# Patient Record
Sex: FEMALE | Race: WHITE | NOT HISPANIC OR LATINO | Employment: UNEMPLOYED | URBAN - METROPOLITAN AREA
[De-identification: names, ages, dates, MRNs, and addresses within clinical notes are randomized per-mention and may not be internally consistent; named-entity substitution may affect disease eponyms.]

---

## 2017-03-31 ENCOUNTER — HOSPITAL ENCOUNTER (OUTPATIENT)
Dept: ULTRASOUND IMAGING | Facility: CLINIC | Age: 27
Discharge: HOME | End: 2017-03-31
Attending: PHYSICIAN ASSISTANT | Admitting: PHYSICIAN ASSISTANT
Payer: COMMERCIAL

## 2017-03-31 DIAGNOSIS — R93.89 ULTRASOUND SCAN ABNORMAL: ICD-10-CM

## 2017-03-31 PROCEDURE — 76642 ULTRASOUND BREAST LIMITED: CPT | Mod: 50

## 2018-05-23 ENCOUNTER — HOSPITAL ENCOUNTER (EMERGENCY)
Facility: CLINIC | Age: 28
Discharge: HOME | End: 2018-05-23
Attending: EMERGENCY MEDICINE | Admitting: EMERGENCY MEDICINE
Payer: COMMERCIAL

## 2018-05-23 VITALS
TEMPERATURE: 98 F | DIASTOLIC BLOOD PRESSURE: 70 MMHG | BODY MASS INDEX: 21.71 KG/M2 | WEIGHT: 115 LBS | OXYGEN SATURATION: 99 % | SYSTOLIC BLOOD PRESSURE: 103 MMHG | RESPIRATION RATE: 18 BRPM | HEIGHT: 61 IN

## 2018-05-23 DIAGNOSIS — N10 ACUTE PYELONEPHRITIS: ICD-10-CM

## 2018-05-23 LAB
ALBUMIN UR-MCNC: 100 MG/DL
ANION GAP SERPL CALCULATED.3IONS-SCNC: 8 MMOL/L (ref 3–14)
APPEARANCE UR: ABNORMAL
BACTERIA #/AREA URNS HPF: ABNORMAL /HPF
BASOPHILS # BLD AUTO: 0 10E9/L (ref 0–0.2)
BASOPHILS NFR BLD AUTO: 0.3 %
BILIRUB UR QL STRIP: NEGATIVE
BUN SERPL-MCNC: 7 MG/DL (ref 7–30)
CALCIUM SERPL-MCNC: 8.5 MG/DL (ref 8.5–10.1)
CHLORIDE SERPL-SCNC: 106 MMOL/L (ref 94–109)
CO2 SERPL-SCNC: 25 MMOL/L (ref 20–32)
COLOR UR AUTO: YELLOW
CREAT SERPL-MCNC: 0.64 MG/DL (ref 0.52–1.04)
DIFFERENTIAL METHOD BLD: ABNORMAL
EOSINOPHIL # BLD AUTO: 0.1 10E9/L (ref 0–0.7)
EOSINOPHIL NFR BLD AUTO: 1.2 %
ERYTHROCYTE [DISTWIDTH] IN BLOOD BY AUTOMATED COUNT: 11.7 % (ref 10–15)
GFR SERPL CREATININE-BSD FRML MDRD: >90 ML/MIN/1.7M2
GLUCOSE SERPL-MCNC: 91 MG/DL (ref 70–99)
GLUCOSE UR STRIP-MCNC: NEGATIVE MG/DL
HCG UR QL: NEGATIVE
HCT VFR BLD AUTO: 38 % (ref 35–47)
HGB BLD-MCNC: 12.7 G/DL (ref 11.7–15.7)
HGB UR QL STRIP: ABNORMAL
IMM GRANULOCYTES # BLD: 0 10E9/L (ref 0–0.4)
IMM GRANULOCYTES NFR BLD: 0.4 %
KETONES UR STRIP-MCNC: NEGATIVE MG/DL
LEUKOCYTE ESTERASE UR QL STRIP: ABNORMAL
LYMPHOCYTES # BLD AUTO: 1.9 10E9/L (ref 0.8–5.3)
LYMPHOCYTES NFR BLD AUTO: 17.2 %
MCH RBC QN AUTO: 31.1 PG (ref 26.5–33)
MCHC RBC AUTO-ENTMCNC: 33.4 G/DL (ref 31.5–36.5)
MCV RBC AUTO: 93 FL (ref 78–100)
MONOCYTES # BLD AUTO: 0.8 10E9/L (ref 0–1.3)
MONOCYTES NFR BLD AUTO: 7.2 %
MUCOUS THREADS #/AREA URNS LPF: PRESENT /LPF
NEUTROPHILS # BLD AUTO: 8.3 10E9/L (ref 1.6–8.3)
NEUTROPHILS NFR BLD AUTO: 73.7 %
NITRATE UR QL: NEGATIVE
NRBC # BLD AUTO: 0 10*3/UL
NRBC BLD AUTO-RTO: 0 /100
PH UR STRIP: 5 PH (ref 5–7)
PLATELET # BLD AUTO: 191 10E9/L (ref 150–450)
POTASSIUM SERPL-SCNC: 3.4 MMOL/L (ref 3.4–5.3)
RBC # BLD AUTO: 4.08 10E12/L (ref 3.8–5.2)
RBC #/AREA URNS AUTO: 125 /HPF (ref 0–2)
SODIUM SERPL-SCNC: 139 MMOL/L (ref 133–144)
SOURCE: ABNORMAL
SP GR UR STRIP: 1.01 (ref 1–1.03)
SQUAMOUS #/AREA URNS AUTO: 2 /HPF (ref 0–1)
UROBILINOGEN UR STRIP-MCNC: 0 MG/DL (ref 0–2)
WBC # BLD AUTO: 11.2 10E9/L (ref 4–11)
WBC #/AREA URNS AUTO: >182 /HPF (ref 0–5)
WBC CLUMPS #/AREA URNS HPF: PRESENT /HPF

## 2018-05-23 PROCEDURE — 85025 COMPLETE CBC W/AUTO DIFF WBC: CPT | Performed by: EMERGENCY MEDICINE

## 2018-05-23 PROCEDURE — 87186 SC STD MICRODIL/AGAR DIL: CPT | Performed by: EMERGENCY MEDICINE

## 2018-05-23 PROCEDURE — 87088 URINE BACTERIA CULTURE: CPT | Performed by: EMERGENCY MEDICINE

## 2018-05-23 PROCEDURE — 25000132 ZZH RX MED GY IP 250 OP 250 PS 637: Performed by: EMERGENCY MEDICINE

## 2018-05-23 PROCEDURE — 81025 URINE PREGNANCY TEST: CPT | Performed by: EMERGENCY MEDICINE

## 2018-05-23 PROCEDURE — 80048 BASIC METABOLIC PNL TOTAL CA: CPT | Performed by: EMERGENCY MEDICINE

## 2018-05-23 PROCEDURE — 81001 URINALYSIS AUTO W/SCOPE: CPT | Performed by: EMERGENCY MEDICINE

## 2018-05-23 PROCEDURE — 25000128 H RX IP 250 OP 636: Performed by: EMERGENCY MEDICINE

## 2018-05-23 PROCEDURE — 96365 THER/PROPH/DIAG IV INF INIT: CPT

## 2018-05-23 PROCEDURE — 96375 TX/PRO/DX INJ NEW DRUG ADDON: CPT

## 2018-05-23 PROCEDURE — 99284 EMERGENCY DEPT VISIT MOD MDM: CPT | Mod: 25

## 2018-05-23 PROCEDURE — 87086 URINE CULTURE/COLONY COUNT: CPT | Performed by: EMERGENCY MEDICINE

## 2018-05-23 RX ORDER — CEFTRIAXONE 1 G/1
1 INJECTION, POWDER, FOR SOLUTION INTRAMUSCULAR; INTRAVENOUS ONCE
Status: COMPLETED | OUTPATIENT
Start: 2018-05-23 | End: 2018-05-23

## 2018-05-23 RX ORDER — HYDROCODONE BITARTRATE AND ACETAMINOPHEN 5; 325 MG/1; MG/1
1-2 TABLET ORAL EVERY 4 HOURS PRN
Qty: 6 TABLET | Refills: 0 | Status: ON HOLD | OUTPATIENT
Start: 2018-05-23 | End: 2019-03-10

## 2018-05-23 RX ORDER — ONDANSETRON 4 MG/1
4 TABLET, ORALLY DISINTEGRATING ORAL EVERY 8 HOURS PRN
Qty: 10 TABLET | Refills: 0 | Status: SHIPPED | OUTPATIENT
Start: 2018-05-23 | End: 2018-05-26

## 2018-05-23 RX ORDER — HYDROCODONE BITARTRATE AND ACETAMINOPHEN 5; 325 MG/1; MG/1
1 TABLET ORAL ONCE
Status: COMPLETED | OUTPATIENT
Start: 2018-05-23 | End: 2018-05-23

## 2018-05-23 RX ORDER — KETOROLAC TROMETHAMINE 15 MG/ML
15 INJECTION, SOLUTION INTRAMUSCULAR; INTRAVENOUS ONCE
Status: COMPLETED | OUTPATIENT
Start: 2018-05-23 | End: 2018-05-23

## 2018-05-23 RX ORDER — ONDANSETRON 2 MG/ML
4 INJECTION INTRAMUSCULAR; INTRAVENOUS ONCE
Status: COMPLETED | OUTPATIENT
Start: 2018-05-23 | End: 2018-05-23

## 2018-05-23 RX ORDER — CIPROFLOXACIN 500 MG/1
500 TABLET, FILM COATED ORAL 2 TIMES DAILY
Qty: 14 TABLET | Refills: 0 | Status: SHIPPED | OUTPATIENT
Start: 2018-05-23 | End: 2018-05-30

## 2018-05-23 RX ADMIN — SODIUM CHLORIDE 1000 ML: 9 INJECTION, SOLUTION INTRAVENOUS at 05:38

## 2018-05-23 RX ADMIN — HYDROCODONE BITARTRATE AND ACETAMINOPHEN 1 TABLET: 5; 325 TABLET ORAL at 06:26

## 2018-05-23 RX ADMIN — KETOROLAC TROMETHAMINE 15 MG: 15 INJECTION, SOLUTION INTRAMUSCULAR; INTRAVENOUS at 05:42

## 2018-05-23 RX ADMIN — ONDANSETRON 4 MG: 2 INJECTION INTRAMUSCULAR; INTRAVENOUS at 05:41

## 2018-05-23 RX ADMIN — CEFTRIAXONE SODIUM 1 G: 1 INJECTION, POWDER, FOR SOLUTION INTRAMUSCULAR; INTRAVENOUS at 05:54

## 2018-05-23 ASSESSMENT — ENCOUNTER SYMPTOMS
DYSURIA: 1
FEVER: 0
DIARRHEA: 0
FLANK PAIN: 1
ABDOMINAL PAIN: 1
FREQUENCY: 1
HEMATURIA: 0
VOMITING: 1

## 2018-05-23 NOTE — ED AVS SNAPSHOT
[facility] Emergency Department    [address]    [facility] [address]    Phone:  [phone]    Fax:  [phone]                                       Caroline Del Valle   MRN: [id number]    Department:  [facility] Emergency Department   Date of Visit:  5/23/2018           Patient Information     Date Of Birth          1990        Your diagnoses for this visit were:     Acute pyelonephritis        You were seen by Kriss Ayers MD.      Follow-up Information     Follow up with Tiara Kelly PA-C In 2 days.    Specialty:  Physician Assistant    Contact information:    [facility]  [address]  [facility] [address]  [phone]          Follow up with [facility] Emergency Department.    Specialty:  EMERGENCY MEDICINE    Why:  If symptoms worsen    Contact information:    [address]  [facility] [id number] [phone]        Discharge Instructions           For your infection,an antibiotic from the fluoroquinolone family was prescribed. All medications can cause side effects or adverse reactions.  It is thought that there is a small chance that this medication can affect your nerves.  If you experience numbness, tingling or weakness, stop taking this medication and call your doctor.  It is thought that there is a small chance of tendon injuries/inflammation/rupture associated with this particular family of medications. It is concerning enough that the FDA has a specific warning about it. In choosing this medication, I ve considered alternatives but, given your infection, past history, allergies, and other factors, I think this is the best possible choice and that the risk to you is small. If you were to develop pain around your tendons/joints, stop taking the medication and contact a doctor.  If you develop joint or extremity pain, avoid doing heavy lifting or strenuous activities with the affected  area.  Secondly, all antibiotics increase your risk of diarrhea by changing that bacteria in the intestines. This family of medications also increases your risk of specific bacteral diarrheal infections such as C. dificile.  If you develop diarrhea or bloody stools, contact your doctor as you may need stool testing.    Discharge Instructions  Urinary Tract Infection  You have urinary tract infection, or UTI. The urinary tract includes the kidneys (which make urine), ureters (the tubes that carry urine from the kidneys to the bladder), the bladder (which stores urine), and urethra (the tube that carries urine out of the bladder).  Urinary tract infections occur when bacteria travel up the urethra into the bladder. We suspect a UTI based on chemical and microscopic findings in your urine, but if there is a question about your findings, we will do a culture to see if bacteria grow. A urine culture takes several days. You should always follow-up with your primary physician to find out about results of your culture if one was done.   Return to the Emergency Department if:    You have severe back pain.    You are vomiting so that you can t take your medicine, or have signs of dehydration (such as urinating less than 3 times per day).    You have fever over 101.5 degrees F.    You have significant confusion or are very weak, or feel very ill.    Your child seems much more ill, won t wake up, won t respond right, or is crying for a long time and won t calm down.    Your child is showing signs of dehydration, Signs of dehydration can be:  o Your infant has had no wet diapers in 4-5 hours.  o Your older child has not passed urine in 6-8 hours.  o Your infant or child starts to have dry mouth and lips, or no saliva or tears.    Follow-up with your doctor:     Children under 24 months need to be seen by their regular doctor within one week after a diagnosis of a UTI. It may be necessary to do some imaging tests to look at the  child s kidney or bladder.    You should begin to feel better within 24 - 48 hours of starting your antibiotic.  If you do not, you need to be seen again.      Treatment:     You will be treated with an antibiotic to kill the bacteria. We have to make an educated guess as to which antibiotic will work for your infection. In most healthy people, we can guess right almost all of the time. Sometimes a culture is done to show which antibiotics will work. This usually takes 2-3 days. When the culture is done, we may have to contact you to put you on a different antibiotic.    Take a pain medication such as Tylenol  (acetaminophen), Advil  (ibuprofen), Nuprin  (ibuprofen), or Aleve  (naproxen). If you have been given a narcotic such as Vicodin  (hydrocodone with acetaminophen), Percocet  (oxycodone with acetaminophen), or codeine, do not drive for four hours after you have taken it. If the narcotic contains Tylenol  (acetaminophen), do not take Tylenol  with it. All narcotics will cause constipation, so eat a high fiber diet.      Pyridium  (phenazopyridine) or Uristat  (phenazopyridine) is a prescription medication that numbs the bladder to reduce the burning pain of some UTIs.  The same medication is available in a non-prescription version called Azo-Standard  (phenazopyridine), Urodol  (phenazopyridine), or other brand names. This medication will change the color of the urine and tears (usually blue or orange). If you wear contacts, do not wear them while taking this medication as they may be stained by the medication.    Antibiotic Warning:     If you have been placed on antibiotics - watch for signs of allergic reaction.  These include rash, lip swelling, difficulty breathing, wheezing, and dizziness.  If you develop any of these symptoms, stop the antibiotic immediately and go to an emergency room or urgent care for evaluation.    Probiotics: If you have been given an antibiotic, you may want to also take a  "probiotic pill or eat yogurt with live cultures. Probiotics have \"good bacteria\" to help your intestines stay healthy. Studies have shown that probiotics help prevent diarrhea and other intestine problems (including C. diff infection) when you take antibiotics. You can buy these without a prescription in the pharmacy section of the store.   If you were given a prescription for medicine here today, be sure to read all of the information (including the package insert) that comes with your prescription.  This will include important information about the medicine, its side effects, and any warnings that you need to know about.  The pharmacist who fills the prescription can provide more information and answer questions you may have about the medicine.  If you have questions or concerns that the pharmacist cannot address, please call or return to the Emergency Department.   Opioid Medication Information    Pain medications are among the most commonly prescribed medicines, so we are including this information for all our patients. If you did not receive pain medication or get a prescription for pain medicine, you can ignore it.     You may have been given a prescription for an opioid (narcotic) pain medicine and/or have received a pain medicine while here in the Emergency Department. These medicines can make you drowsy or impaired. You must not drive, operate dangerous equipment, or engage in any other dangerous activities while taking these medications. If you drive while taking these medications, you could be arrested for DUI, or driving under the influence. Do not drink any alcohol while you are taking these medications.     Opioid pain medications can cause addiction. If you have a history of chemical dependency of any type, you are at a higher risk of becoming addicted to pain medications.  Only take these prescribed medications to treat your pain when all other options have been tried. Take it for as short a time and " as few doses as possible. Store your pain pills in a secure place, as they are frequently stolen and provide a dangerous opportunity for children or visitors in your house to start abusing these powerful medications. We will not replace any lost or stolen medicine.  As soon as your pain is better, you should flush all your remaining medication.     Many prescription pain medications contain Tylenol  (acetaminophen), including Vicodin , Tylenol #3 , Norco , Lortab , and Percocet .  You should not take any extra pills of Tylenol  if you are using these prescription medications or you can get very sick.  Do not ever take more than 3000 mg of acetaminophen in any 24 hour period.    All opioids tend to cause constipation. Drink plenty of water and eat foods that have a lot of fiber, such as fruits, vegetables, prune juice, apple juice and high fiber cereal.  Take a laxative if you don t move your bowels at least every other day. Miralax , Milk of Magnesia, Colace , or Senna  can be used to keep you regular.      Remember that you can always come back to the Emergency Department if you are not able to see your regular doctor in the amount of time listed above, if you get any new symptoms, or if there is anything that worries you.        24 Hour Appointment Hotline       To make an appointment at any [place] clinic, call [phone] ([phone]). If you don't have a family doctor or clinic, we will help you find one. [place] clinics are conveniently located to serve the needs of you and your family.             Review of your medicines      START taking        Dose / Directions Last dose taken    ciprofloxacin 500 MG tablet   Commonly known as:  CIPRO   Dose:  500 mg   Quantity:  14 tablet        Take 1 tablet (500 mg) by mouth 2 times daily for 7 days   Refills:  0        HYDROcodone-acetaminophen 5-325 MG per tablet   Commonly known as:  NORCO   Dose:  1-2 tablet   Quantity:  6 tablet        Take 1-2 tablets by  mouth every 4 hours as needed for pain   Refills:  0        ondansetron 4 MG ODT tab   Commonly known as:  ZOFRAN ODT   Dose:  4 mg   Quantity:  10 tablet        Take 1 tablet (4 mg) by mouth every 8 hours as needed for nausea   Refills:  0                Information about OPIOIDS     PRESCRIPTION OPIOIDS: WHAT YOU NEED TO KNOW   You have a prescription for an opioid (narcotic) pain medicine. Opioids can cause addiction. If you have a history of chemical dependency of any type, you are at a higher risk of becoming addicted to opioids. Only take this medicine after all other options have been tried. Take it for as short a time and as few doses as possible.     Do not:    Drive. If you drive while taking these medicines, you could be arrested for driving under the influence (DUI).    Operate heavy machinery    Do any other dangerous activities while taking these medicines.     Drink any alcohol while taking these medicines.      Take with any other medicines that contain acetaminophen. Read all labels carefully. Look for the word  acetaminophen  or  Tylenol.  Ask your pharmacist if you have questions or are unsure.    Store your pills in a secure place, locked if possible. We will not replace any lost or stolen medicine. If you don t finish your medicine, please throw away (dispose) as directed by your pharmacist. The [place] [employer] has more information about safe disposal: https://www.pca.[facility].[place]/living-green/managing-unwanted-medications    All opioids tend to cause constipation. Drink plenty of water and eat foods that have a lot of fiber, such as fruits, vegetables, prune juice, apple juice and high-fiber cereal. Take a laxative (Miralax, milk of magnesia, Colace, Senna) if you don t move your bowels at least every other day.         Prescriptions were sent or printed at these locations (3 Prescriptions)                   Other Prescriptions                Printed at Department/Unit printer  (3 of 3)         ciprofloxacin (CIPRO) 500 MG tablet               HYDROcodone-acetaminophen (NORCO) 5-325 MG per tablet               ondansetron (ZOFRAN ODT) 4 MG ODT tab                Procedures and tests performed during your visit     Basic metabolic panel    CBC + differential    HCG qualitative urine    UA reflex to Microscopic and Culture    Urine Culture Aerobic Bacterial      Orders Needing Specimen Collection     None      Pending Results     Date and Time Order Name Status Description    5/23/2018 0510 Urine Culture Aerobic Bacterial In process             Pending Culture Results     Date and Time Order Name Status Description    5/23/2018 0510 Urine Culture Aerobic Bacterial In process             Pending Results Instructions     If you had any lab results that were not finalized at the time of your Discharge, you can call the ED Lab Result RN at [phone]. You will be contacted by this team for any positive Lab results or changes in treatment. The nurses are available 7 days a week from 10A to 6:30P.  You can leave a message 24 hours per day and they will return your call.        Test Results From Your Hospital Stay        5/23/2018  5:30 AM      Component Results     Component Value Ref Range & Units Status    Color Urine Yellow  Final    Appearance Urine Cloudy  Final    Glucose Urine Negative NEG^Negative mg/dL Final    Bilirubin Urine Negative NEG^Negative Final    Ketones Urine Negative NEG^Negative mg/dL Final    Specific Gravity Urine 1.015 1.003 - 1.035 Final    Blood Urine Moderate (A) NEG^Negative Final    pH Urine 5.0 5.0 - 7.0 pH Final    Protein Albumin Urine 100 (A) NEG^Negative mg/dL Final    Urobilinogen mg/dL 0.0 0.0 - 2.0 mg/dL Final    Nitrite Urine Negative NEG^Negative Final    Leukocyte Esterase Urine Large (A) NEG^Negative Final    Source Unspecified Urine  Final    RBC Urine 125 (H) 0 - 2 /HPF Final    WBC Urine >182 (H) 0 - 5 /HPF Final    WBC Clumps Present (A)  NEG^Negative /HPF Final    Bacteria Urine Many (A) NEG^Negative /HPF Final    Squamous Epithelial /HPF Urine 2 (H) 0 - 1 /HPF Final    Mucous Urine Present (A) NEG^Negative /LPF Final         5/23/2018  5:37 AM      Component Results     Component Value Ref Range & Units Status    HCG Qual Urine Negative NEG^Negative Final    This test is for screening purposes.  Results should be interpreted along with   the clinical picture.  Confirmation testing is available if warranted by   ordering [id number], HCG Quantitative Pregnancy.           5/23/2018  5:55 AM      Component Results     Component Value Ref Range & Units Status    WBC 11.2 (H) 4.0 - 11.0 10e9/L Final    RBC Count 4.08 3.8 - 5.2 10e12/L Final    Hemoglobin 12.7 11.7 - 15.7 g/dL Final    Hematocrit 38.0 35.0 - 47.0 % Final    MCV 93 78 - 100 fl Final    MCH 31.1 26.5 - 33.0 pg Final    MCHC 33.4 31.5 - 36.5 g/dL Final    RDW 11.7 10.0 - 15.0 % Final    Platelet Count 191 150 - 450 10e9/L Final    Diff Method Automated Method  Final    % Neutrophils 73.7 % Final    % Lymphocytes 17.2 % Final    % Monocytes 7.2 % Final    % Eosinophils 1.2 % Final    % Basophils 0.3 % Final    % Immature Granulocytes 0.4 % Final    Nucleated RBCs 0 0 /100 Final    Absolute Neutrophil 8.3 1.6 - 8.3 10e9/L Final    Absolute Lymphocytes 1.9 0.8 - 5.3 10e9/L Final    Absolute Monocytes 0.8 0.0 - 1.3 10e9/L Final    Absolute Eosinophils 0.1 0.0 - 0.7 10e9/L Final    Absolute Basophils 0.0 0.0 - 0.2 10e9/L Final    Abs Immature Granulocytes 0.0 0 - 0.4 10e9/L Final    Absolute Nucleated RBC 0.0  Final         5/23/2018  6:10 AM      Component Results     Component Value Ref Range & Units Status    Sodium 139 133 - 144 mmol/L Final    Potassium 3.4 3.4 - 5.3 mmol/L Final    Chloride 106 94 - 109 mmol/L Final    Carbon Dioxide 25 20 - 32 mmol/L Final    Anion Gap 8 3 - 14 mmol/L Final    Glucose 91 70 - 99 mg/dL Final    Urea Nitrogen 7 7 - 30 mg/dL Final    Creatinine 0.64 0.52 - 1.04  mg/dL Final    GFR Estimate >90 >60 mL/min/1.7m2 Final    Non  GFR Calc    GFR Estimate If Black >90 >60 mL/min/1.7m2 Final    African American GFR Calc    Calcium 8.5 8.5 - 10.1 mg/dL Final         5/23/2018  5:31 AM                Clinical Quality Measure: Blood Pressure Screening     Your blood pressure was checked while you were in the emergency department today. The last reading we obtained was  BP: 103/70 . Please read the guidelines below about what these numbers mean and what you should do about them.  If your systolic blood pressure (the top number) is less than 120 and your diastolic blood pressure (the bottom number) is less than 80, then your blood pressure is normal. There is nothing more that you need to do about it.  If your systolic blood pressure (the top number) is 120-139 or your diastolic blood pressure (the bottom number) is 80-89, your blood pressure may be higher than it should be. You should have your blood pressure rechecked within a year by a primary care provider.  If your systolic blood pressure (the top number) is 140 or greater or your diastolic blood pressure (the bottom number) is 90 or greater, you may have high blood pressure. High blood pressure is treatable, but if left untreated over time it can put you at risk for heart attack, stroke, or kidney failure. You should have your blood pressure rechecked by a primary care provider within the next 4 weeks.  If your provider in the emergency department today gave you specific instructions to follow-up with your doctor or provider even sooner than that, you should follow that instruction and not wait for up to 4 weeks for your follow-up visit.        Thank you for choosing [facility]       Thank you for choosing [facility] for your care. Our goal is always to provide you with excellent care. Hearing back from our patients is one way we can continue to improve our services. Please take a few minutes to complete the written  "survey that you may receive in the mail after you visit with us. Thank you!        [employer] Information     [employer] lets you send messages to your doctor, view your test results, renew your prescriptions, schedule appointments and more. To sign up, go to [email or web address] . Click on \"Log in\" on the left side of the screen, which will take you to the Welcome page. Then click on \"Sign up Now\" on the right side of the page.     You will be asked to enter the access code listed below, as well as some personal information. Please follow the directions to create your username and password.     Your access code is: [email or web address]  Expires: 2018  6:31 AM     Your access code will  in 90 days. If you need help or a new code, please call your [facility] clinic or [phone].        Care EveryWhere ID     This is your Care EveryWhere ID. This could be used by other organizations to access your [facility] medical records  [id number]        Equal Access to Services     PEG [place] : Hadsofia diazo Somariel, waaxda luqadaha, qaybta kaalmadelfina reynolds, coy marquez . So [facility] [phone].    ATENCIÓN: Si habla español, tiene a maravilla disposición servicios gratuitos de asistencia lingüística. Llame al [phone].    We comply with applicable federal civil rights laws and [place] laws. We do not discriminate on the basis of race, color, national origin, age, disability, sex, sexual orientation, or gender identity.            After Visit Summary       This is your record. Keep this with you and show to your community pharmacist(s) and doctor(s) at your next visit.                  "

## 2018-05-23 NOTE — ED PROVIDER NOTES
"  History     Chief Complaint:  Abdominal Pain    HPI   Caroline Del Valle is an otherwise healthy 27 year old female who presents to the Emergency Department for evaluation of abdominal pain. Upon presentation the patient reports two days of worsening right sided abdominal pain and bloating which wraps around to her right flank, now with associated dysuria, urgency, frequency and one episode of vomiting yesterday. She denies any fevers, hematuria, vaginal bleeding, vaginal discharge, chest pain, shortness of breath or diarrhea. Patient does have a history of UTI remotely but no hx of renal stone or resistent UTI.     Allergies:  No known drug allergies    Medications:    The patient is not currently taking any prescribed medications.    Past Medical History:    The patient denies any significant past medical history.    Past Surgical History:    The patient does not have any pertinent past surgical history.    Family History:    No past pertinent family history.    Social History:  The patient was accompanied to the ED by .  Smoking Status: never smoker  Alcohol Use: no  Marital Status:   [2]     Review of Systems   Constitutional: Negative for fever.   Cardiovascular: Negative for chest pain.   Gastrointestinal: Positive for abdominal pain and vomiting. Negative for diarrhea.   Genitourinary: Positive for dysuria, flank pain, frequency and urgency. Negative for hematuria, vaginal bleeding and vaginal discharge.   All other systems reviewed and are negative.    Physical Exam   First Vitals:  BP: 135/86  Heart Rate: 96  Temp: 98  F (36.7  C)  Resp: 18  Height: 154.9 cm (5' 1\")  Weight: 52.2 kg (115 lb)  SpO2: 99 %    Physical Exam  Gen: alert  HEENT: PERRL, oropharynx clear  CV: RRR, no murmurs  Pulm: breath sounds equal, lungs clear  Abd: Soft, Right side abdominal tenderness. Right CVA tenderness. Suprapubic tenderness.  Back: no evidence of injury  MSK: no deformity, moves all extremities  Skin: no " rash  Neuro: alert, appropriate conversation and interaction    Emergency Department Course     Laboratory:  UA reflex to microscopic and culture: moderate urine blood, protein albumin 100, large leukocyte esterase, (H), WBC >182(H), WBC clumps present, many bacteria, squamous epithelial 2(H), mucous present, o/w WNL.  HCG:  Negative  Urine culture Aerobic Bacterial: In process    Interventions:  0538 NS 1 L IV  0541 Zofran 4 mg IV  0542 Toradol 15 mg IV  0554 Ceftriaxone 1 g IV    Emergency Department Course:  Nursing notes and vitals reviewed. I performed an exam of the patient as documented above.     The patient provided a urine sample here in the emergency department. This was sent for laboratory testing, findings above.    0615 I reassessed the patient. Patient is feeling much better.     Findings and plan explained to the Patient. Patient discharged home with instructions regarding supportive care, medications, and reasons to return. The importance of close follow-up was reviewed.     Impression & Plan      Medical Decision Making:  Caroline Del Valle is a 27 year old female who presents for evaluation of abdominal pain.  Urinalysis is consistent with a urinary tract infection; given the systemic symptoms present, signs and symptoms consistent with pyelonephritis.  There is no clinical evidence of perinephric abscess, emphysematous pyelonephritis, ureterolithiasis, appendicitis, colitis, diverticulitis or any intraabdominal catastrophe.  Gradual onset of pain and no hx of stone therefore despite hematuria I did not feel that CT for ureteral stone was needed.  Pain pattern is not suggestive of ureteral colic.  Patient was given dose of antibiotics in ED; see above.  Given well appearance, I believe the risk of imminent deterioration is low enough that outpatient management is indicated.  Return if increasing pain, vomiting, fever, or inability to tolerate the oral antibiotic.  Follow up with primary  physician is indicated in 1 days.      Diagnosis:    ICD-10-CM    1. Acute pyelonephritis N10      Disposition:  discharged to home    Discharge Medications:  New Prescriptions    CIPROFLOXACIN (CIPRO) 500 MG TABLET    Take 1 tablet (500 mg) by mouth 2 times daily for 7 days    HYDROCODONE-ACETAMINOPHEN (NORCO) 5-325 MG PER TABLET    Take 1-2 tablets by mouth every 4 hours as needed for pain    ONDANSETRON (ZOFRAN ODT) 4 MG ODT TAB    Take 1 tablet (4 mg) by mouth every 8 hours as needed for nausea       IRomi, am serving as a scribe on 5/23/2018 at 5:15 AM to personally document services performed by Kriss Ayers* based on my observations and the provider's statements to me.     Romi Rm  5/23/2018   [facility] EMERGENCY DEPARTMENT       Kriss Ayers MD  05/23/18 0739

## 2018-05-23 NOTE — DISCHARGE INSTRUCTIONS
For your infection,an antibiotic from the fluoroquinolone family was prescribed. All medications can cause side effects or adverse reactions.  It is thought that there is a small chance that this medication can affect your nerves.  If you experience numbness, tingling or weakness, stop taking this medication and call your doctor.  It is thought that there is a small chance of tendon injuries/inflammation/rupture associated with this particular family of medications. It is concerning enough that the FDA has a specific warning about it. In choosing this medication, I ve considered alternatives but, given your infection, past history, allergies, and other factors, I think this is the best possible choice and that the risk to you is small. If you were to develop pain around your tendons/joints, stop taking the medication and contact a doctor.  If you develop joint or extremity pain, avoid doing heavy lifting or strenuous activities with the affected area.  Secondly, all antibiotics increase your risk of diarrhea by changing that bacteria in the intestines. This family of medications also increases your risk of specific bacteral diarrheal infections such as C. dificile.  If you develop diarrhea or bloody stools, contact your doctor as you may need stool testing.    Discharge Instructions  Urinary Tract Infection  You have urinary tract infection, or UTI. The urinary tract includes the kidneys (which make urine), ureters (the tubes that carry urine from the kidneys to the bladder), the bladder (which stores urine), and urethra (the tube that carries urine out of the bladder).  Urinary tract infections occur when bacteria travel up the urethra into the bladder. We suspect a UTI based on chemical and microscopic findings in your urine, but if there is a question about your findings, we will do a culture to see if bacteria grow. A urine culture takes several days. You should always follow-up with your primary physician  to find out about results of your culture if one was done.   Return to the Emergency Department if:    You have severe back pain.    You are vomiting so that you can t take your medicine, or have signs of dehydration (such as urinating less than 3 times per day).    You have fever over 101.5 degrees F.    You have significant confusion or are very weak, or feel very ill.    Your child seems much more ill, won t wake up, won t respond right, or is crying for a long time and won t calm down.    Your child is showing signs of dehydration, Signs of dehydration can be:  o Your infant has had no wet diapers in 4-5 hours.  o Your older child has not passed urine in 6-8 hours.  o Your infant or child starts to have dry mouth and lips, or no saliva or tears.    Follow-up with your doctor:     Children under 24 months need to be seen by their regular doctor within one week after a diagnosis of a UTI. It may be necessary to do some imaging tests to look at the child s kidney or bladder.    You should begin to feel better within 24 - 48 hours of starting your antibiotic.  If you do not, you need to be seen again.      Treatment:     You will be treated with an antibiotic to kill the bacteria. We have to make an educated guess as to which antibiotic will work for your infection. In most healthy people, we can guess right almost all of the time. Sometimes a culture is done to show which antibiotics will work. This usually takes 2-3 days. When the culture is done, we may have to contact you to put you on a different antibiotic.    Take a pain medication such as Tylenol  (acetaminophen), Advil  (ibuprofen), Nuprin  (ibuprofen), or Aleve  (naproxen). If you have been given a narcotic such as Vicodin  (hydrocodone with acetaminophen), Percocet  (oxycodone with acetaminophen), or codeine, do not drive for four hours after you have taken it. If the narcotic contains Tylenol  (acetaminophen), do not take Tylenol  with it. All narcotics  "will cause constipation, so eat a high fiber diet.      Pyridium  (phenazopyridine) or Uristat  (phenazopyridine) is a prescription medication that numbs the bladder to reduce the burning pain of some UTIs.  The same medication is available in a non-prescription version called Azo-Standard  (phenazopyridine), Urodol  (phenazopyridine), or other brand names. This medication will change the color of the urine and tears (usually blue or orange). If you wear contacts, do not wear them while taking this medication as they may be stained by the medication.    Antibiotic Warning:     If you have been placed on antibiotics - watch for signs of allergic reaction.  These include rash, lip swelling, difficulty breathing, wheezing, and dizziness.  If you develop any of these symptoms, stop the antibiotic immediately and go to an emergency room or urgent care for evaluation.    Probiotics: If you have been given an antibiotic, you may want to also take a probiotic pill or eat yogurt with live cultures. Probiotics have \"good bacteria\" to help your intestines stay healthy. Studies have shown that probiotics help prevent diarrhea and other intestine problems (including C. diff infection) when you take antibiotics. You can buy these without a prescription in the pharmacy section of the store.   If you were given a prescription for medicine here today, be sure to read all of the information (including the package insert) that comes with your prescription.  This will include important information about the medicine, its side effects, and any warnings that you need to know about.  The pharmacist who fills the prescription can provide more information and answer questions you may have about the medicine.  If you have questions or concerns that the pharmacist cannot address, please call or return to the Emergency Department.   Opioid Medication Information    Pain medications are among the most commonly prescribed medicines, so we are " including this information for all our patients. If you did not receive pain medication or get a prescription for pain medicine, you can ignore it.     You may have been given a prescription for an opioid (narcotic) pain medicine and/or have received a pain medicine while here in the Emergency Department. These medicines can make you drowsy or impaired. You must not drive, operate dangerous equipment, or engage in any other dangerous activities while taking these medications. If you drive while taking these medications, you could be arrested for DUI, or driving under the influence. Do not drink any alcohol while you are taking these medications.     Opioid pain medications can cause addiction. If you have a history of chemical dependency of any type, you are at a higher risk of becoming addicted to pain medications.  Only take these prescribed medications to treat your pain when all other options have been tried. Take it for as short a time and as few doses as possible. Store your pain pills in a secure place, as they are frequently stolen and provide a dangerous opportunity for children or visitors in your house to start abusing these powerful medications. We will not replace any lost or stolen medicine.  As soon as your pain is better, you should flush all your remaining medication.     Many prescription pain medications contain Tylenol  (acetaminophen), including Vicodin , Tylenol #3 , Norco , Lortab , and Percocet .  You should not take any extra pills of Tylenol  if you are using these prescription medications or you can get very sick.  Do not ever take more than 3000 mg of acetaminophen in any 24 hour period.    All opioids tend to cause constipation. Drink plenty of water and eat foods that have a lot of fiber, such as fruits, vegetables, prune juice, apple juice and high fiber cereal.  Take a laxative if you don t move your bowels at least every other day. Miralax , Milk of Magnesia, Colace , or Senna  can  be used to keep you regular.      Remember that you can always come back to the Emergency Department if you are not able to see your regular doctor in the amount of time listed above, if you get any new symptoms, or if there is anything that worries you.

## 2018-05-23 NOTE — ED TRIAGE NOTES
Patient present to ED due pain to lower abd radiating to pelvis and R flank. Reports burning sensation to void     Reports vomiting yesterday. Denies fever.     Last BM yesterday morning     Denies taking OTC meds PTA

## 2018-05-23 NOTE — ED AVS SNAPSHOT
[facility]    [address]    [facility] [address]    Phone:  [phone]    Fax:  [phone]                                       Caroline Del Valle   MRN: [id number]    Department:  [facility]   Date of Visit:  5/23/2018           After Visit Summary Signature Page     I have received my discharge instructions, and my questions have been answered. I have discussed any challenges I see with this plan with the nurse or doctor.    ..........................................................................................................................................  Patient/Patient Representative Signature      ..........................................................................................................................................  Patient Representative Print Name and Relationship to Patient    ..................................................               ................................................  Date                                            Time    ..........................................................................................................................................  Reviewed by Signature/Title    ...................................................              ..............................................  Date                                                            Time

## 2018-05-24 LAB
BACTERIA SPEC CULT: ABNORMAL
Lab: ABNORMAL
SPECIMEN SOURCE: ABNORMAL

## 2018-10-07 ENCOUNTER — TRANSFERRED RECORDS (OUTPATIENT)
Dept: HEALTH INFORMATION MANAGEMENT | Facility: CLINIC | Age: 28
End: 2018-10-07

## 2018-10-07 ENCOUNTER — APPOINTMENT (OUTPATIENT)
Dept: ULTRASOUND IMAGING | Facility: CLINIC | Age: 28
End: 2018-10-07
Attending: EMERGENCY MEDICINE
Payer: COMMERCIAL

## 2018-10-07 ENCOUNTER — HOSPITAL ENCOUNTER (EMERGENCY)
Facility: CLINIC | Age: 28
Discharge: HOME | End: 2018-10-07
Attending: EMERGENCY MEDICINE | Admitting: EMERGENCY MEDICINE
Payer: COMMERCIAL

## 2018-10-07 VITALS
SYSTOLIC BLOOD PRESSURE: 99 MMHG | WEIGHT: 119.93 LBS | RESPIRATION RATE: 18 BRPM | TEMPERATURE: 98.6 F | BODY MASS INDEX: 22.66 KG/M2 | HEART RATE: 90 BPM | OXYGEN SATURATION: 100 % | DIASTOLIC BLOOD PRESSURE: 68 MMHG

## 2018-10-07 DIAGNOSIS — R10.31 ABDOMINAL PAIN, RIGHT LOWER QUADRANT: ICD-10-CM

## 2018-10-07 DIAGNOSIS — Z3A.18 18 WEEKS GESTATION OF PREGNANCY: ICD-10-CM
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
## 2018-10-07 LAB
ABO + RH BLD: NORMAL
ABO + RH BLD: NORMAL
ALBUMIN UR-MCNC: NEGATIVE MG/DL
APPEARANCE UR: CLEAR
BASOPHILS # BLD AUTO: 0 10E9/L (ref 0–0.2)
BASOPHILS NFR BLD AUTO: 0.3 %
BILIRUB UR QL STRIP: NEGATIVE
COLOR UR AUTO: NORMAL
DIFFERENTIAL METHOD BLD: ABNORMAL
EOSINOPHIL # BLD AUTO: 0.2 10E9/L (ref 0–0.7)
EOSINOPHIL NFR BLD AUTO: 1.6 %
ERYTHROCYTE [DISTWIDTH] IN BLOOD BY AUTOMATED COUNT: 12.4 % (ref 10–15)
GLUCOSE UR STRIP-MCNC: NEGATIVE MG/DL
HCT VFR BLD AUTO: 37.6 % (ref 35–47)
HGB BLD-MCNC: 12.7 G/DL (ref 11.7–15.7)
HGB F BLD QL KLEIH BETKE: NORMAL
HGB UR QL STRIP: NEGATIVE
IMM GRANULOCYTES # BLD: 0.1 10E9/L (ref 0–0.4)
IMM GRANULOCYTES NFR BLD: 0.5 %
KETONES UR STRIP-MCNC: NEGATIVE MG/DL
LEUKOCYTE ESTERASE UR QL STRIP: NEGATIVE
LYMPHOCYTES # BLD AUTO: 2.3 10E9/L (ref 0.8–5.3)
LYMPHOCYTES NFR BLD AUTO: 19.7 %
MCH RBC QN AUTO: 31.7 PG (ref 26.5–33)
MCHC RBC AUTO-ENTMCNC: 33.8 G/DL (ref 31.5–36.5)
MCV RBC AUTO: 94 FL (ref 78–100)
MONOCYTES # BLD AUTO: 0.9 10E9/L (ref 0–1.3)
MONOCYTES NFR BLD AUTO: 7.7 %
NEUTROPHILS # BLD AUTO: 8.1 10E9/L (ref 1.6–8.3)
NEUTROPHILS NFR BLD AUTO: 70.2 %
NITRATE UR QL: NEGATIVE
NRBC # BLD AUTO: 0 10*3/UL
NRBC BLD AUTO-RTO: 0 /100
PH UR STRIP: 6 PH (ref 5–7)
PLATELET # BLD AUTO: 202 10E9/L (ref 150–450)
RBC # BLD AUTO: 4.01 10E12/L (ref 3.8–5.2)
RBC #/AREA URNS AUTO: 1 /HPF (ref 0–2)
SOURCE: NORMAL
SP GR UR STRIP: 1 (ref 1–1.03)
SPECIMEN EXP DATE BLD: NORMAL
SQUAMOUS #/AREA URNS AUTO: 1 /HPF (ref 0–1)
UROBILINOGEN UR STRIP-MCNC: 0 MG/DL (ref 0–2)
WBC # BLD AUTO: 11.5 10E9/L (ref 4–11)
WBC #/AREA URNS AUTO: 2 /HPF (ref 0–5)

## 2018-10-07 PROCEDURE — 86900 BLOOD TYPING SEROLOGIC ABO: CPT | Performed by: EMERGENCY MEDICINE

## 2018-10-07 PROCEDURE — 76815 OB US LIMITED FETUS(S): CPT

## 2018-10-07 PROCEDURE — 99284 EMERGENCY DEPT VISIT MOD MDM: CPT | Mod: 25

## 2018-10-07 PROCEDURE — 85460 HEMOGLOBIN FETAL: CPT | Performed by: EMERGENCY MEDICINE

## 2018-10-07 PROCEDURE — 86901 BLOOD TYPING SEROLOGIC RH(D): CPT | Performed by: EMERGENCY MEDICINE

## 2018-10-07 PROCEDURE — 81001 URINALYSIS AUTO W/SCOPE: CPT | Performed by: EMERGENCY MEDICINE

## 2018-10-07 PROCEDURE — 85025 COMPLETE CBC W/AUTO DIFF WBC: CPT | Performed by: EMERGENCY MEDICINE

## 2018-10-07 ASSESSMENT — ENCOUNTER SYMPTOMS
NECK PAIN: 0
ABDOMINAL PAIN: 1
HEMATURIA: 0
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
## 2018-10-07 NOTE — ED AVS SNAPSHOT
[facility] Emergency Department    [address]    [facility] [address]    Phone:  [phone]    Fax:  [phone]                                       Caroline Del Valle   MRN: [id number]    Department:  [facility] Emergency Department   Date of Visit:  10/7/2018           After Visit Summary Signature Page     I have received my discharge instructions, and my questions have been answered. I have discussed any challenges I see with this plan with the nurse or doctor.    ..........................................................................................................................................  Patient/Patient Representative Signature      ..........................................................................................................................................  Patient Representative Print Name and Relationship to Patient    ..................................................               ................................................  Date                                   Time    ..........................................................................................................................................  Reviewed by Signature/Title    ...................................................              ..............................................  Date                                               Time          22EPIC Rev 08/18
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
## 2018-12-20 LAB — TREPONEMA ANTIBODIES: NORMAL

## 2019-02-11 LAB — GROUP B STREP PCR: POSITIVE

## 2019-03-08 ENCOUNTER — HOSPITAL ENCOUNTER (OUTPATIENT)
Facility: CLINIC | Age: 29
End: 2019-03-08
Payer: COMMERCIAL

## 2019-03-10 ENCOUNTER — ANESTHESIA EVENT (OUTPATIENT)
Dept: OBGYN | Facility: CLINIC | Age: 29
End: 2019-03-10
Payer: COMMERCIAL

## 2019-03-10 ENCOUNTER — ANESTHESIA (OUTPATIENT)
Dept: OBGYN | Facility: CLINIC | Age: 29
End: 2019-03-10
Payer: COMMERCIAL

## 2019-03-10 ENCOUNTER — HOSPITAL ENCOUNTER (INPATIENT)
Facility: CLINIC | Age: 29
LOS: 3 days | Discharge: HOME | End: 2019-03-13
Attending: OBSTETRICS & GYNECOLOGY | Admitting: OBSTETRICS & GYNECOLOGY
Payer: COMMERCIAL

## 2019-03-10 LAB
ABO + RH BLD: NORMAL
ABO + RH BLD: NORMAL
BLD GP AB SCN SERPL QL: NORMAL
BLOOD BANK CMNT PATIENT-IMP: NORMAL
RUPTURE OF FETAL MEMBRANES BY ROM PLUS: POSITIVE
SPECIMEN EXP DATE BLD: NORMAL

## 2019-03-10 PROCEDURE — 12000000 ZZH R&B MED SURG/OB

## 2019-03-10 PROCEDURE — 25000125 ZZHC RX 250: Performed by: OBSTETRICS & GYNECOLOGY

## 2019-03-10 PROCEDURE — 3E0R3BZ INTRODUCTION OF ANESTHETIC AGENT INTO SPINAL CANAL, PERCUTANEOUS APPROACH: ICD-10-PCS | Performed by: ANESTHESIOLOGY

## 2019-03-10 PROCEDURE — 86901 BLOOD TYPING SEROLOGIC RH(D): CPT | Performed by: OBSTETRICS & GYNECOLOGY

## 2019-03-10 PROCEDURE — 86850 RBC ANTIBODY SCREEN: CPT | Performed by: OBSTETRICS & GYNECOLOGY

## 2019-03-10 PROCEDURE — 25800030 ZZH RX IP 258 OP 636: Performed by: OBSTETRICS & GYNECOLOGY

## 2019-03-10 PROCEDURE — 84112 EVAL AMNIOTIC FLUID PROTEIN: CPT | Performed by: OBSTETRICS & GYNECOLOGY

## 2019-03-10 PROCEDURE — G0463 HOSPITAL OUTPT CLINIC VISIT: HCPCS

## 2019-03-10 PROCEDURE — 25800030 ZZH RX IP 258 OP 636: Performed by: ANESTHESIOLOGY

## 2019-03-10 PROCEDURE — 37000011 ZZH ANESTHESIA WARD SERVICE

## 2019-03-10 PROCEDURE — 25000128 H RX IP 250 OP 636: Performed by: OBSTETRICS & GYNECOLOGY

## 2019-03-10 PROCEDURE — 0064U ANTB TP TOTAL&RPR IA QUAL: CPT | Performed by: OBSTETRICS & GYNECOLOGY

## 2019-03-10 PROCEDURE — 40000671 ZZH STATISTIC ANESTHESIA CASE

## 2019-03-10 PROCEDURE — 86900 BLOOD TYPING SEROLOGIC ABO: CPT | Performed by: OBSTETRICS & GYNECOLOGY

## 2019-03-10 PROCEDURE — 27110038 ZZH RX 271: Performed by: ANESTHESIOLOGY

## 2019-03-10 PROCEDURE — 00HU33Z INSERTION OF INFUSION DEVICE INTO SPINAL CANAL, PERCUTANEOUS APPROACH: ICD-10-PCS | Performed by: ANESTHESIOLOGY

## 2019-03-10 PROCEDURE — 25000128 H RX IP 250 OP 636: Performed by: ANESTHESIOLOGY

## 2019-03-10 RX ORDER — NALOXONE HYDROCHLORIDE 0.4 MG/ML
.1-.4 INJECTION, SOLUTION INTRAMUSCULAR; INTRAVENOUS; SUBCUTANEOUS
Status: DISCONTINUED | OUTPATIENT
Start: 2019-03-10 | End: 2019-03-11 | Stop reason: CLARIF

## 2019-03-10 RX ORDER — CHLORAL HYDRATE 500 MG
2 CAPSULE ORAL DAILY
COMMUNITY

## 2019-03-10 RX ORDER — IBUPROFEN 800 MG/1
800 TABLET, FILM COATED ORAL
Status: DISCONTINUED | OUTPATIENT
Start: 2019-03-10 | End: 2019-03-11 | Stop reason: CLARIF

## 2019-03-10 RX ORDER — OXYTOCIN/0.9 % SODIUM CHLORIDE 30/500 ML
1-24 PLASTIC BAG, INJECTION (ML) INTRAVENOUS CONTINUOUS
Status: DISCONTINUED | OUTPATIENT
Start: 2019-03-10 | End: 2019-03-11 | Stop reason: CLARIF

## 2019-03-10 RX ORDER — SODIUM CHLORIDE, SODIUM LACTATE, POTASSIUM CHLORIDE, CALCIUM CHLORIDE 600; 310; 30; 20 MG/100ML; MG/100ML; MG/100ML; MG/100ML
INJECTION, SOLUTION INTRAVENOUS CONTINUOUS
Status: DISCONTINUED | OUTPATIENT
Start: 2019-03-10 | End: 2019-03-11 | Stop reason: CLARIF

## 2019-03-10 RX ORDER — OXYTOCIN/0.9 % SODIUM CHLORIDE 30/500 ML
100-340 PLASTIC BAG, INJECTION (ML) INTRAVENOUS CONTINUOUS PRN
Status: COMPLETED | OUTPATIENT
Start: 2019-03-10 | End: 2019-03-11

## 2019-03-10 RX ORDER — PENICILLIN G POTASSIUM 5000000 [IU]/1
5 INJECTION, POWDER, FOR SOLUTION INTRAMUSCULAR; INTRAVENOUS ONCE
Status: COMPLETED | OUTPATIENT
Start: 2019-03-10 | End: 2019-03-10

## 2019-03-10 RX ORDER — LIDOCAINE 40 MG/G
CREAM TOPICAL
Status: DISCONTINUED | OUTPATIENT
Start: 2019-03-10 | End: 2019-03-11 | Stop reason: CLARIF

## 2019-03-10 RX ORDER — ONDANSETRON 2 MG/ML
4 INJECTION INTRAMUSCULAR; INTRAVENOUS EVERY 6 HOURS PRN
Status: DISCONTINUED | OUTPATIENT
Start: 2019-03-10 | End: 2019-03-11 | Stop reason: CLARIF

## 2019-03-10 RX ORDER — OXYCODONE AND ACETAMINOPHEN 5; 325 MG/1; MG/1
1 TABLET ORAL
Status: DISCONTINUED | OUTPATIENT
Start: 2019-03-10 | End: 2019-03-11

## 2019-03-10 RX ORDER — OXYTOCIN 10 [USP'U]/ML
10 INJECTION, SOLUTION INTRAMUSCULAR; INTRAVENOUS
Status: DISCONTINUED | OUTPATIENT
Start: 2019-03-10 | End: 2019-03-11 | Stop reason: CLARIF

## 2019-03-10 RX ORDER — CARBOPROST TROMETHAMINE 250 UG/ML
250 INJECTION, SOLUTION INTRAMUSCULAR
Status: DISCONTINUED | OUTPATIENT
Start: 2019-03-10 | End: 2019-03-11

## 2019-03-10 RX ORDER — NALBUPHINE HYDROCHLORIDE 10 MG/ML
2.5-5 INJECTION, SOLUTION INTRAMUSCULAR; INTRAVENOUS; SUBCUTANEOUS EVERY 6 HOURS PRN
Status: DISCONTINUED | OUTPATIENT
Start: 2019-03-10 | End: 2019-03-11 | Stop reason: CLARIF

## 2019-03-10 RX ORDER — BUPIVACAINE HCL/0.9 % NACL/PF 0.125 %
PLASTIC BAG, INJECTION (ML) EPIDURAL CONTINUOUS
Status: DISCONTINUED | OUTPATIENT
Start: 2019-03-10 | End: 2019-03-11 | Stop reason: CLARIF

## 2019-03-10 RX ORDER — METHYLERGONOVINE MALEATE 0.2 MG/ML
200 INJECTION INTRAVENOUS
Status: DISCONTINUED | OUTPATIENT
Start: 2019-03-10 | End: 2019-03-11

## 2019-03-10 RX ORDER — ACETAMINOPHEN 325 MG/1
650 TABLET ORAL EVERY 4 HOURS PRN
Status: DISCONTINUED | OUTPATIENT
Start: 2019-03-10 | End: 2019-03-11

## 2019-03-10 RX ORDER — PRENATAL VIT/IRON FUM/FOLIC AC 27MG-0.8MG
1 TABLET ORAL DAILY
COMMUNITY

## 2019-03-10 RX ORDER — EPHEDRINE SULFATE 50 MG/ML
5 INJECTION, SOLUTION INTRAMUSCULAR; INTRAVENOUS; SUBCUTANEOUS
Status: DISCONTINUED | OUTPATIENT
Start: 2019-03-10 | End: 2019-03-11 | Stop reason: CLARIF

## 2019-03-10 RX ORDER — TERBUTALINE SULFATE 1 MG/ML
0.25 INJECTION, SOLUTION SUBCUTANEOUS
Status: DISCONTINUED | OUTPATIENT
Start: 2019-03-10 | End: 2019-03-11 | Stop reason: CLARIF

## 2019-03-10 RX ADMIN — PENICILLIN G POTASSIUM 5 MILLION UNITS: 5000000 POWDER, FOR SOLUTION INTRAMUSCULAR; INTRAPLEURAL; INTRATHECAL; INTRAVENOUS at 11:50

## 2019-03-10 RX ADMIN — SODIUM CHLORIDE, POTASSIUM CHLORIDE, SODIUM LACTATE AND CALCIUM CHLORIDE: 600; 310; 30; 20 INJECTION, SOLUTION INTRAVENOUS at 11:21

## 2019-03-10 RX ADMIN — SODIUM CHLORIDE 2.5 MILLION UNITS: 9 INJECTION, SOLUTION INTRAVENOUS at 19:43

## 2019-03-10 RX ADMIN — SODIUM CHLORIDE 2.5 MILLION UNITS: 9 INJECTION, SOLUTION INTRAVENOUS at 23:58

## 2019-03-10 RX ADMIN — OXYTOCIN-SODIUM CHLORIDE 0.9% IV SOLN 30 UNIT/500ML 1 MILLI-UNITS/MIN: 30-0.9/5 SOLUTION at 15:14

## 2019-03-10 RX ADMIN — SODIUM CHLORIDE, POTASSIUM CHLORIDE, SODIUM LACTATE AND CALCIUM CHLORIDE: 600; 310; 30; 20 INJECTION, SOLUTION INTRAVENOUS at 14:05

## 2019-03-10 RX ADMIN — SODIUM CHLORIDE 2.5 MILLION UNITS: 9 INJECTION, SOLUTION INTRAVENOUS at 15:54

## 2019-03-10 RX ADMIN — ONDANSETRON 4 MG: 2 INJECTION INTRAMUSCULAR; INTRAVENOUS at 12:13

## 2019-03-10 RX ADMIN — Medication: at 13:26

## 2019-03-10 ASSESSMENT — ACTIVITIES OF DAILY LIVING (ADL): FALL_HISTORY_WITHIN_LAST_SIX_MONTHS: NO

## 2019-03-10 NOTE — H&P
[facility] Labor and Delivery History and Physical    Caroline Del Valle MRN# [id number]   Age: 28 year old YOB: 1990     Date of Admission:  3/10/2019           HPI:   Caroline Del Valle is a 28 year old  at 40w0d by 6w1d US not c/w LMP admitted for contractions.  Patient reports intermittent contractions throughout yesterday, with increasing frequency around 2300 last evening.  She denies any vaginal bleeding (did have pink-tinged discharge yesterday).  Patient reports increased discharge or possible LOF overnight; was changing panty liner every 2-3 hours.  Amount is similar today. Contractions every 3-5 minutes; most pain in her back. Good fetal movement.     Pregnancy c/b:   - EIF of fetal stomach; follow-up unremarkable          Pregnancy history:     OBSTETRIC HISTORY:  Obstetric History       T0      L0     SAB0   TAB0   Ectopic0   Multiple0   Live Births0       # Outcome Date GA Lbr Moises/2nd Weight Sex Delivery Anes PTL Lv   1 Current                   EDC: Estimated Date of Delivery: Mar 10, 2019    Prenatal Labs:   Lab Results   Component Value Date    ABO A 03/10/2019    RH Pos 03/10/2019    AS Neg 03/10/2019    HGB 12.7 10/07/2018       GBS Status:   Lab Results   Component Value Date    GBS positive 2019       Ultrasounds:  Growth US on :   IMPRESSION: Single living intrauterine fetus with sonographic gestational age of 36 weeks 4 days based on today's study. GA by prior ultrasound should be 38 weeks 2 days. Fetal measurements as detailed above.    Result Narrative   COMPARISON:  2018.    TECHNIQUE: Limited ultrasound examination was performed for evaluation of fetal growth, amniotic fluid index (BRANDIN).    Type of Gestation:  Kru[place].  Fetal Presentation:  CEPHALIC  Fetal Movement Present:  Yes  Cardiac Rate:  136 bpm and is regular  4-quadrant BRANDIN: 14.1 cm. Normal.  Q1: 4.7 cm.  Q2: 4.6 cm.  Q3: 4.7 cm.  Q4: 0.0 cm.    Placental Position: POSTERIOR  "LT . Normal.  Cervical Length (cm):   Not visualized    Fetal Measurements (Source [place]):    BPD:  9.2 cm = 37w2d   HC: 32.7 cm = 37w1d   AC:  35.3 cm = 39w2d   FL:  7.0 cm = 35w5d     Anatomic Ratios:  Within normal limits.   Abdominal Circumference Percentile: 88.3%    Estimated Fetal Weight:  3363 grams  Weight Percentile: 56.5%    Other Findings: None.    GA by LMP:  39w3d   GA by Prior US:  38w2d  GA by today's US:  36w4d   NIKA by today's US:  03/22/2019           Maternal Past Medical History:   No past medical history on file.  No past surgical history on file.   Medications Prior to Admission   Medication Sig Dispense Refill Last Dose     fish oil-omega-3 fatty acids 1000 MG capsule Take 2 g by mouth daily        Prenatal Vit-Fe Fumarate-FA (PRENATAL MULTIVITAMIN W/IRON) 27-0.8 MG tablet Take 1 tablet by mouth daily              Family History:   family history is not on file.          Social History:     Social History     Tobacco Use     Smoking status: Never Smoker     Smokeless tobacco: Never Used   Substance Use Topics     Alcohol use: No            Review of Systems:   The Review of Systems is negative other than noted in the HPI          Physical Exam:     Patient Vitals for the past 8 hrs:   Temp Temp src Resp Height   03/10/19 1130 (P) 98  F (36.7  C) (P) Oral -- --   03/10/19 0804 97.9  F (36.6  C) Oral 18 1.549 m (5' 1\")     Gen: Pleasant, NAD   CV:  Regular rate and rhythm, no murmurs, rubs or gallops appreciated   Resp: Non-labored breathing.  Lungs clear to ausculation bilaterally   Abd: Soft, non-tender and non-distended   Ext: Trace pedal edema bilaterally     Cervix: 2/80%/-2 per RN @ 0930  Membranes: SROM, clear, @ 1010  EFW: 6.5-7 lbs.  Presentation:Cephalic     Fetal Heart Rate Tracing:   Baseline 140  Variability: Moderate  Accelerations: Present  Decelerations: None  Interpretation: reactive    Contractions: q 3-6 min per EFM        Assessment:   Caroline Del Valle is a 28 year old "  at 40w0d admitted for latent labor.        Plan:     Labor:   - Patient with minimal change prior to SROM at 1010, clear fluid.  Will start pitocin augmentation 2 hours after PCN load.     Pain: Per patient, NO now with prepping for epidural     FWB:   - Continous EFM   - Category I FHT, reactive     Other:   - Rh positive, RI, placenta posterior, EFW 6.5-7#     GBS positive:   - PCN load given at 1150, continue q4 dosing     Rebecca Cleaning MD   Pager: [phone]   March 10, 2019

## 2019-03-10 NOTE — ANESTHESIA PROCEDURE NOTES
Peripheral nerve/Neuraxial procedure note :  Pre-Procedure  Performed by   Referred by [facility]  Location:       .       Assessment/Narrative  .  .  . Comments:  Pre-Procedure  Performed by Jasvir Moses MD  Location: OB.      PreAnesthestic Checklist: patient identified, IV checked, risks and benefits discussed, informed consent obtained, monitors and equipment checked, pre-op evaluation and at physician/surgeon's request.    Timeout   Correct Patient: Yes  Correct Procedure: Epidural catheter placement  Correct Site: Yes   Correct Position: Yes    Procedure Documentation  Procedure:   Epidural catheter block for Labor    Patient currently in labor and she and OBMD request a labor epidural to control her labor pains. Patient was interviewed and examined. Procedure and risks including but not limited to bleeding, infection, nerve injury, paralysis, PDPH, and inadequate block requiring intervention discussed with patient. Questions answered. This epidural is to be placed in anticipation of vaginal delivery.  She consents to the epidural procedure.  Time-out was performed.  I or my partners remain immediately available for management of any issues or complications and will monitor at appropriate intervals.  Procedure: Patient sitting. Betadine prep x 3. Sterile drape applied.  Mask and gloves used.  Lidocaine 1%  local infiltration at L 3-4.  17 G. Tuohy needle at L3-4 by loss of resistance into epidural space.  No CSF, paresthesia or blood. 1.5 % Lidocaine with 1:200,000 Epinephrine 5cc test dose. Epidural catheter inserted w/o resistance to 5 cm in epidural space. Then 0.125% bupivicaine 15 cc with NS 5 cc.  Aspiration negative for blood and CSF.   Negative for neuro change, paresthesia or symptoms of intravascular injection or intrathecal injection.  Infusion orders written and infusion of 0.125% bupivicaine 15cc per hour started.    Jasvir Moses MD

## 2019-03-10 NOTE — ANESTHESIA PREPROCEDURE EVALUATION
"Anesthesia Pre-Procedure Evaluation    Patient: Caroline Del Valle   MRN: [id number] : 1990          Preoperative Diagnosis: * No surgery found *        No past medical history on file.  No past surgical history on file.  Anesthesia Evaluation       history and physical reviewed .             ROS/MED HX    ENT/Pulmonary:  - neg pulmonary ROS     Neurologic:       Cardiovascular:  - neg cardiovascular ROS       METS/Exercise Tolerance:     Hematologic:         Musculoskeletal:         GI/Hepatic:         Renal/Genitourinary:         Endo:         Psychiatric:         Infectious Disease:         Malignancy:         Other:                     neg OB ROS            Physical Exam      Airway   Mallampati: II  TM distance: > 3 FB  Neck ROM: full    Dental     Cardiovascular       Pulmonary             Lab Results   Component Value Date    WBC 11.5 (H) 10/07/2018    HGB 12.7 10/07/2018    HCT 37.6 10/07/2018     10/07/2018     2018    POTASSIUM 3.4 2018    CHLORIDE 106 2018    CO2 25 2018    BUN 7 2018    CR 0.64 2018    GLC 91 2018    ELVIS 8.5 2018    HCG Negative 2018       Preop Vitals  BP Readings from Last 3 Encounters:   10/07/18 99/68   18 103/70    Pulse Readings from Last 3 Encounters:   10/07/18 90      Resp Readings from Last 3 Encounters:   03/10/19 18   10/07/18 18   18 18    SpO2 Readings from Last 3 Encounters:   10/07/18 100%   18 99%      Temp Readings from Last 1 Encounters:   03/10/19 (P) 98  F (36.7  C) ((P) Oral)    Ht Readings from Last 1 Encounters:   03/10/19 1.549 m (5' 1\")      Wt Readings from Last 1 Encounters:   10/07/18 54.4 kg (119 lb 14.9 oz)    Estimated body mass index is 22.66 kg/m  as calculated from the following:    Height as of this encounter: 1.549 m (5' 1\").    Weight as of 10/7/18: 54.4 kg (119 lb 14.9 oz).       Anesthesia Plan      History & Physical Review      ASA Status:  2 .         "     Postoperative Care      Consents  Anesthetic plan, risks, benefits and alternatives discussed with:  Patient..                 Jasvir Moses MD                    .

## 2019-03-10 NOTE — PROVIDER NOTIFICATION
03/10/19 0900   Provider Notification   Provider Name/Title Dr. Andujar   Method of Notification In Department   Notification Reason Patient Arrived;Status Update     Dr. Cleaning notified of patient's arrival, UC's frequency and quality. Will have patient ambulate and recheck cervix around 0930.

## 2019-03-10 NOTE — PROVIDER NOTIFICATION
03/10/19 1507   Provider Notification   Provider Name/Title Dr. Cleaning   Method of Notification In Department   Notification Reason Status Update     Updated on labor progress.

## 2019-03-11 LAB — T PALLIDUM AB SER QL: NONREACTIVE

## 2019-03-11 PROCEDURE — 72200001 ZZH LABOR CARE VAGINAL DELIVERY SINGLE

## 2019-03-11 PROCEDURE — 0HQ9XZZ REPAIR PERINEUM SKIN, EXTERNAL APPROACH: ICD-10-PCS | Performed by: OBSTETRICS & GYNECOLOGY

## 2019-03-11 PROCEDURE — 25000125 ZZHC RX 250: Performed by: OBSTETRICS & GYNECOLOGY

## 2019-03-11 PROCEDURE — 25000132 ZZH RX MED GY IP 250 OP 250 PS 637: Performed by: OBSTETRICS & GYNECOLOGY

## 2019-03-11 PROCEDURE — 0UQMXZZ REPAIR VULVA, EXTERNAL APPROACH: ICD-10-PCS | Performed by: OBSTETRICS & GYNECOLOGY

## 2019-03-11 PROCEDURE — 12000000 ZZH R&B MED SURG/OB

## 2019-03-11 RX ORDER — OXYTOCIN/0.9 % SODIUM CHLORIDE 30/500 ML
340 PLASTIC BAG, INJECTION (ML) INTRAVENOUS CONTINUOUS PRN
Status: DISCONTINUED | OUTPATIENT
Start: 2019-03-11 | End: 2019-03-13 | Stop reason: HOSPADM

## 2019-03-11 RX ORDER — IBUPROFEN 800 MG/1
800 TABLET, FILM COATED ORAL EVERY 6 HOURS PRN
Status: DISCONTINUED | OUTPATIENT
Start: 2019-03-11 | End: 2019-03-13 | Stop reason: HOSPADM

## 2019-03-11 RX ORDER — OXYCODONE HYDROCHLORIDE 5 MG/1
5 TABLET ORAL EVERY 4 HOURS PRN
Status: DISCONTINUED | OUTPATIENT
Start: 2019-03-11 | End: 2019-03-13 | Stop reason: HOSPADM

## 2019-03-11 RX ORDER — BISACODYL 10 MG
10 SUPPOSITORY, RECTAL RECTAL DAILY PRN
Status: DISCONTINUED | OUTPATIENT
Start: 2019-03-13 | End: 2019-03-13 | Stop reason: HOSPADM

## 2019-03-11 RX ORDER — AMOXICILLIN 250 MG
1 CAPSULE ORAL 2 TIMES DAILY
Status: DISCONTINUED | OUTPATIENT
Start: 2019-03-11 | End: 2019-03-13 | Stop reason: HOSPADM

## 2019-03-11 RX ORDER — MISOPROSTOL 200 UG/1
800 TABLET ORAL
Status: DISCONTINUED | OUTPATIENT
Start: 2019-03-11 | End: 2019-03-13 | Stop reason: HOSPADM

## 2019-03-11 RX ORDER — HYDROCORTISONE 2.5 %
CREAM (GRAM) TOPICAL 3 TIMES DAILY PRN
Status: DISCONTINUED | OUTPATIENT
Start: 2019-03-11 | End: 2019-03-13 | Stop reason: HOSPADM

## 2019-03-11 RX ORDER — METHYLERGONOVINE MALEATE 0.2 MG/ML
200 INJECTION INTRAVENOUS
Status: DISCONTINUED | OUTPATIENT
Start: 2019-03-11 | End: 2019-03-13 | Stop reason: HOSPADM

## 2019-03-11 RX ORDER — LANOLIN 100 %
OINTMENT (GRAM) TOPICAL
Status: DISCONTINUED | OUTPATIENT
Start: 2019-03-11 | End: 2019-03-13 | Stop reason: HOSPADM

## 2019-03-11 RX ORDER — ACETAMINOPHEN 325 MG/1
650 TABLET ORAL EVERY 4 HOURS PRN
Status: DISCONTINUED | OUTPATIENT
Start: 2019-03-11 | End: 2019-03-13 | Stop reason: HOSPADM

## 2019-03-11 RX ORDER — AMOXICILLIN 250 MG
2 CAPSULE ORAL 2 TIMES DAILY
Status: DISCONTINUED | OUTPATIENT
Start: 2019-03-11 | End: 2019-03-13 | Stop reason: HOSPADM

## 2019-03-11 RX ORDER — OXYTOCIN 10 [USP'U]/ML
10 INJECTION, SOLUTION INTRAMUSCULAR; INTRAVENOUS
Status: DISCONTINUED | OUTPATIENT
Start: 2019-03-11 | End: 2019-03-13 | Stop reason: HOSPADM

## 2019-03-11 RX ORDER — CARBOPROST TROMETHAMINE 250 UG/ML
250 INJECTION, SOLUTION INTRAMUSCULAR
Status: DISCONTINUED | OUTPATIENT
Start: 2019-03-11 | End: 2019-03-13 | Stop reason: HOSPADM

## 2019-03-11 RX ORDER — OXYTOCIN/0.9 % SODIUM CHLORIDE 30/500 ML
100 PLASTIC BAG, INJECTION (ML) INTRAVENOUS CONTINUOUS
Status: DISCONTINUED | OUTPATIENT
Start: 2019-03-11 | End: 2019-03-13 | Stop reason: HOSPADM

## 2019-03-11 RX ORDER — NALOXONE HYDROCHLORIDE 0.4 MG/ML
.1-.4 INJECTION, SOLUTION INTRAMUSCULAR; INTRAVENOUS; SUBCUTANEOUS
Status: DISCONTINUED | OUTPATIENT
Start: 2019-03-11 | End: 2019-03-13 | Stop reason: HOSPADM

## 2019-03-11 RX ADMIN — ACETAMINOPHEN 650 MG: 325 TABLET, FILM COATED ORAL at 13:03

## 2019-03-11 RX ADMIN — SENNOSIDES AND DOCUSATE SODIUM 1 TABLET: 8.6; 5 TABLET ORAL at 20:35

## 2019-03-11 RX ADMIN — OXYTOCIN-SODIUM CHLORIDE 0.9% IV SOLN 30 UNIT/500ML 340 ML/HR: 30-0.9/5 SOLUTION at 00:09

## 2019-03-11 RX ADMIN — IBUPROFEN 800 MG: 800 TABLET ORAL at 16:00

## 2019-03-11 RX ADMIN — IBUPROFEN 800 MG: 800 TABLET ORAL at 01:11

## 2019-03-11 RX ADMIN — IBUPROFEN 800 MG: 800 TABLET ORAL at 08:34

## 2019-03-11 RX ADMIN — SENNOSIDES AND DOCUSATE SODIUM 1 TABLET: 8.6; 5 TABLET ORAL at 08:35

## 2019-03-11 NOTE — PLAN OF CARE
Data: Caroline Del Valle transferred to room 446 via wheelchair at 0255. Baby transferred via parent's arms.  Action: Receiving unit notified of transfer: Yes. Patient and family notified of room change. Report given to Bhavani ROSAS RN at 0305. Belongings sent to receiving unit. Accompanied by Registered Nurse. Oriented patient to surroundings. Call light within reach. ID bands double-checked with receiving RN.  Response: Patient tolerated transfer and is stable.

## 2019-03-11 NOTE — PROVIDER NOTIFICATION
03/10/19 2115   Provider Notification   Provider Name/Title Dr. Queen   Method of Notification Phone   Request Evaluate in Person   Notification Reason SVE;Status Update     Dr. Queen updated. SVE 10/100/1. Pt is feeling perineal and rectal pressure. MD states to begin pushing with Pt, MD will come to the hospital and is 20 minutes away.

## 2019-03-11 NOTE — LACTATION NOTE
This note was copied from a baby's chart.   visit. Her baby has been nursing better.  No concerns were noted at this time.  Plan for continued lactation support and follow up tomorrow.

## 2019-03-11 NOTE — PROVIDER NOTIFICATION
03/10/19 7718   Provider Notification   Provider Name/Title Dr. Queen   Method of Notification Phone   Notification Reason Status Update;Uterine Activity      phoned in and updated with Pitocin dosage, ctx frequency and last cervical exam.

## 2019-03-11 NOTE — PLAN OF CARE
Patient stable this evening. Performing skin to skin with  and attempting breast feeding frequently. Independent with self and  cares. Gave patient tpump for back pain at epidural site and donut for significant perineal discomfort. Ibuprofen given as well.

## 2019-03-11 NOTE — PROVIDER NOTIFICATION
03/10/19 1954   Provider Notification   Provider Name/Title Dr. Queen   Method of Notification Phone   Request Evaluate - Remote   Notification Reason SVE;Labor Status;Status Update     Dr. Queen updated. Pt expressed that she was starting to feel more pressure but is comfortable with her epidural. SVE 8//0-1. Nataly every 1.5-3.5 minutes with 4 milliunits/min of pitocin infusing. FHR category 1. MD states to check cervix again at 2100 and call with results of exam. MD is 15-20 minutes away from the hospital.

## 2019-03-11 NOTE — PROVIDER NOTIFICATION
03/10/19 2348   Provider Notification   Provider Name/Title Dr. Queen   Method of Notification At Bedside   Request Attend Delivery     Dr. Queen at bedside for delivery.

## 2019-03-11 NOTE — ANESTHESIA POSTPROCEDURE EVALUATION
Patient: Caroline Del Valle    * No procedures listed *    Diagnosis:* No pre-op diagnosis entered *  Diagnosis Additional Information: Labor pain    Anesthesia Type:  Epidural    Note:  Anesthesia Post Evaluation         Comments:     S/P epidural for labor.   I or my partner was immediately available for management of this patient during epidural analgesia infusion.  VSS.  Doing well. Block resolved.  Neuro at baseline. Denies positional headache. Minimal side effects easily managed w/ PRN meds. No apparent anesthetic complications. No follow-up required.    Alan Silver MD        Last vitals:  Vitals:    03/11/19 0213 03/11/19 0226 03/11/19 0834   BP: 131/69 122/67 127/83   Pulse:      Resp:   18   Temp:   98.1  F (36.7  C)         Electronically Signed By: Alan Silver MD  March 11, 2019  11:10 AM

## 2019-03-11 NOTE — PLAN OF CARE
Meeting goals for shift, see flow sheet. Up and steady on feet, voiding without difficulty. Caring for self and infant in room with spouse and bonding well. IBU, tucks and ice for comfort. Fluids and ambulation encouraged.

## 2019-03-11 NOTE — PROVIDER NOTIFICATION
03/10/19 2152   Provider Notification   Provider Name/Title Dr. Queen   Method of Notification At Bedside   Request Evaluate in Person   Notification Reason Status Update     Dr. Queen at bedside to evaluate start of pushing progress.

## 2019-03-11 NOTE — PROVIDER NOTIFICATION
03/10/19 2254   Provider Notification   Provider Name/Title Dr. Queen   Method of Notification At Bedside   Request Evaluate in Person   Notification Reason Status Update     Dr. Queen at bedside to evaluate pushing progress.

## 2019-03-11 NOTE — L&D DELIVERY NOTE
DELIVERY NOTE  Caroline Del Valle is a 28 year old  at 40w1d admitted with labor.  under epidural, liveborn male infant, OA position, light meconium stained fluid, over intact perineum. Baby info as above. Nose and oropharynx bulb suctioned at maternal abdomen. Both shoulders delivered without complication. There was a loose nuchal cord that was manually removed without complicaitons. Placenta spontaneously delivered intact with 3VC. No cervical lacerations. There was a right periurethral laceration that was repaired with a running, non locking stitch with 4-0 vicryl. There was a first degree laceration that was repaired in the usual fashion with 3-0 vicryl under residua epidural effects. EBL = 59. Counts correct. Dispo stable in LDR.   Dr. Bora Queen  [phone]

## 2019-03-11 NOTE — PLAN OF CARE
Received into room 446. Pt able to pivot transfer from  wheelchair,but states that leg still feels heavy. Reinforced to  be sure to call for assistance before getting up out of bed. Fundal checks WNL, no excessive bleeding. Nursing infant with minimal assistance to get infant to latch. Bag of pitocin completed, IV saline locked. Spouse rooming in. At 0500,  requested infant be taken to nursery so they can get some sleep. Meeting expected goals.

## 2019-03-12 PROCEDURE — 25000132 ZZH RX MED GY IP 250 OP 250 PS 637: Performed by: OBSTETRICS & GYNECOLOGY

## 2019-03-12 PROCEDURE — 12000000 ZZH R&B MED SURG/OB

## 2019-03-12 RX ADMIN — IBUPROFEN 800 MG: 800 TABLET ORAL at 23:37

## 2019-03-12 RX ADMIN — SENNOSIDES AND DOCUSATE SODIUM 2 TABLET: 8.6; 5 TABLET ORAL at 10:16

## 2019-03-12 RX ADMIN — IBUPROFEN 800 MG: 800 TABLET ORAL at 04:53

## 2019-03-12 RX ADMIN — SENNOSIDES AND DOCUSATE SODIUM 1 TABLET: 8.6; 5 TABLET ORAL at 21:37

## 2019-03-12 NOTE — PLAN OF CARE
Needing some assist and education on breastfeeding. Worried baby is not eating enough. Motrin for discomfort.

## 2019-03-12 NOTE — PLAN OF CARE
Meeting goals for shift, see flow sheet. Patient caring for self and infant in room and bonding well. Couplet seen by lactation, see note.  Denies pain. Encouraged feedings every 2-3 hours. Encouraged fluids and ambulation. Anticipate discharge tomorrow.

## 2019-03-12 NOTE — LACTATION NOTE
This note was copied from a baby's chart.  LC visit.  Baby continues to struggle to latch at the initial feeding, but once latched, maintains the latch well and gulps.  Good volumes of colostrum noted. LC assisted x 2 with latching today.  Plan for continued support.

## 2019-03-13 VITALS
TEMPERATURE: 98.5 F | BODY MASS INDEX: 22.66 KG/M2 | HEART RATE: 75 BPM | RESPIRATION RATE: 16 BRPM | SYSTOLIC BLOOD PRESSURE: 116 MMHG | DIASTOLIC BLOOD PRESSURE: 78 MMHG | HEIGHT: 61 IN

## 2019-03-13 PROCEDURE — 25000132 ZZH RX MED GY IP 250 OP 250 PS 637: Performed by: OBSTETRICS & GYNECOLOGY

## 2019-03-13 RX ORDER — AMOXICILLIN 250 MG
1 CAPSULE ORAL 2 TIMES DAILY PRN
Qty: 60 TABLET | Refills: 1 | Status: ON HOLD | OUTPATIENT
Start: 2019-03-13 | End: 2021-01-03

## 2019-03-13 RX ORDER — IBUPROFEN 600 MG/1
600 TABLET, FILM COATED ORAL EVERY 6 HOURS PRN
Qty: 90 TABLET | Refills: 1 | Status: ON HOLD | OUTPATIENT
Start: 2019-03-13 | End: 2021-01-03

## 2019-03-13 RX ORDER — ACETAMINOPHEN 325 MG/1
325-650 TABLET ORAL EVERY 6 HOURS PRN
Qty: 100 TABLET | Refills: 0 | Status: ON HOLD | OUTPATIENT
Start: 2019-03-13 | End: 2021-01-03

## 2019-03-13 RX ADMIN — IBUPROFEN 800 MG: 800 TABLET ORAL at 12:14

## 2019-03-13 RX ADMIN — IBUPROFEN 800 MG: 800 TABLET ORAL at 05:56

## 2019-03-13 RX ADMIN — SENNOSIDES AND DOCUSATE SODIUM 1 TABLET: 8.6; 5 TABLET ORAL at 12:14

## 2019-03-13 NOTE — PLAN OF CARE
Data: Vital signs within normal limits. Postpartum checks within normal limits - see flow record. Patient eating and drinking normally. Patient able to empty bladder independently and is up ambulating. No apparent signs of infection. laceration healing well. Patient performing self cares and is able to care for infant.  Action: Patient medicated during the shift for pain. See MAR. Patient reassessed within 1 hour after each medication and pain was improved - patient stated she was comfortable. Patient education done about discharge teaching, when to follow up. See flow record.  Response: Positive attachment behaviors observed with infant. Support persons FOB present.   Plan: discharged today at 1420.

## 2019-03-13 NOTE — DISCHARGE INSTRUCTIONS
Postpartum Vaginal Delivery Instructions    Activity       Ask family and friends for help when you need it.    Do not place anything in your vagina for 6 weeks.    You are not restricted on other activities, but take it easy for a few weeks to allow your body to recover from delivery.  You are able to do any activities you feel up to that point.    No driving until you have stopped taking your pain medications (usually two weeks after delivery).     Call your health care provider if you have any of these symptoms:       Increased pain, swelling, redness, or fluid around your stiches from an episiotomy or perineal tear.    A fever above 100.4 F (38 C) with or without chills when placing a thermometer under your tongue.    You soak a sanitary pad with blood within 1 hour, or you see blood clots larger than a golf ball.    Bleeding that lasts more than 6 weeks.    Vaginal discharge that smells bad.    Severe pain, cramping or tenderness in your lower belly area.    A need to urinate more frequently (use the toilet more often), more urgently (use the toilet very quickly), or it burns when you urinate.    Nausea and vomiting.    Redness, swelling or pain around a vein in your leg.    Problems breastfeeding or a red or painful area on your breast.    Chest pain and cough or are gasping for air.    Problems coping with sadness, anxiety, or depression.  If you have any concerns about hurting yourself or the baby, call your provider immediately.     You have questions or concerns after you return home.     Keep your hands clean:  Always wash your hands before touching your perineal area and stitches.  This helps reduce your risk of infection.  If your hands aren't dirty, you may use an alcohol hand-rub to clean your hands. Keep your nails clean and short.          Caroline-    Congratulations on your new baby!    A few instructions:    -No tampons/douching/intercourse x 6 weeks.  See your health care provider first, to be  "sure you are healed.  -If you have stitches, it may help you feel better sooner ifyou can sit in the tub twice daily for a few minutes, for the first week after delivery.  Keep the area clean and dry.  -Call your clinic if fever, worsening pain, or any questions.    Medications:  Ibuprofen 600 mg every 6 hours will be helpful for pain and cramping.  You may want to use a stool softener if needed, available over the counter, like Colace 100 mg twice daily.    Post partum depression:   10-15% of women will have more than the baby blues--if you are feeling very sad or down, having regrets, can't stop thinking about certain thoughts or actions, you could be experiencing post partum depression.  Please call your health care provider to discuss.    Follow up:  Please call your clinic soon to schedule a \"post partum visit\" for 6 weeks from now.   If you need anything by prescription for contraception, they will go over it with you at that visit.    Again, congratulations!  Welcome, baby Raheem!    Haley Rae MD March 13, 2019           "

## 2019-03-13 NOTE — PLAN OF CARE
Patient vital signs stable and meeting expected outcomes.  Breastfeeding well with minimal assistance from staff.  Up independently and voiding adequately.  Pain well controlled with ibuprofen.  Able to perform all cares for self and infant.  Bonding well with baby.   present and supportive at bedside.  Educational videos watched.  Plan to discharge home today.  Will continue to monitor.

## 2019-03-13 NOTE — PLAN OF CARE
Doing well with self and infant cares, breast feeding independently. Declines offers of pain medication this shift, denies difficulty voiding. FOB present and supportive, involved in infant cares.

## 2019-03-13 NOTE — LACTATION NOTE
This note was copied from a baby's chart.  LC follow up. Infant was latched during visit and swallows noted. Her milk is coming in. LC reviewed engorgement and how to manage it.  LC also encouraged her to continue to nurse her baby often and on demand.  No concerns noted today.  She feels much more comfortable today with positioning.

## 2020-06-23 LAB
HBV SURFACE AG SERPL QL IA: NEGATIVE
HIV 1+2 AB+HIV1 P24 AG SERPL QL IA: NEGATIVE
RUBELLA ABY IGG: NORMAL

## 2020-09-30 NOTE — PROGRESS NOTES
"[facility]   PROGRESS NOTE     S. Pt comfortable with epidural in place. Currently pushing adequateyly . +FM    /59   Pulse 101   Temp 97.4  F (36.3  C) (Oral)   Resp 16   Ht 1.549 m (5' 1\")   LMP 2018   BMI 22.66 kg/m      [place] ctxs q 2-3 min   bpm, mod, a +, d -  SVE 10/ 100 / +1    A/P Caroline Del Valle is a 28 year old  at 40w0d here with labor  - currently pushing with good effort   - FHT Cat I  - anticipate     Dr. Bora Queen  [phone]       "
"PPD #1    Caroline Del Valle is a 28 year old  s/p  yesterday.  No complaints.  Baby boy Raheem.    OBJECTIVE:  Blood pressure 116/86, pulse 101, temperature 98.1  F (36.7  C), temperature source Oral, resp. rate 16, height 1.549 m (5' 1\"), last menstrual period 2018, unknown if currently breastfeeding.    WDWN woman in NAD   FF U -1    Hemoglobin   Date Value Ref Range Status   10/07/2018 12.7 11.7 - 15.7 g/dL Final   2018 12.7 11.7 - 15.7 g/dL Final       ASSESSMENT:  S/P , doing well.  Active Problems:    Encounter for triage in pregnant patient (3/10/2019)    Indication for care in labor or delivery (3/10/2019)    Vaginal delivery (3/11/2019)        PLAN:  Continue post partum cares.  Anticipate DC tomorrow.    Haley Rae MD 2019   "
"PPD #2    No complaints, doing well.  Ready for discharge today.  Baby Raheem nursing better today.    OBJECTIVE:  Blood pressure 123/76, pulse 75, temperature 97.7  F (36.5  C), temperature source Oral, resp. rate 16, height 1.549 m (5' 1\"), last menstrual period 2018, unknown if currently breastfeeding.    WDWN woman in NAD.  FF U -1    Hemoglobin   Date Value Ref Range Status   10/07/2018 12.7 11.7 - 15.7 g/dL Final   2018 12.7 11.7 - 15.7 g/dL Final       ASSESSMENT:  S/P  doing well  PPD #2  Active Problems:    Encounter for triage in pregnant patient (3/10/2019)    Indication for care in labor or delivery (3/10/2019)    Vaginal delivery (3/11/2019)      PLAN:  OK to discharge home today.  Post partum restrictions and what to expect in puerperium reviewed. Rx Ibuprofen OTC.  RTC 6 weeks for post partum cares, pelvic rest until then.    Haley Rae MD 2019   "
Detail Level: Detailed

## 2020-12-07 LAB — GROUP B STREP PCR: NEGATIVE

## 2021-01-02 ENCOUNTER — HOSPITAL ENCOUNTER (INPATIENT)
Facility: CLINIC | Age: 31
LOS: 1 days | Discharge: HOME HEALTH | End: 2021-01-03
Attending: OBSTETRICS & GYNECOLOGY | Admitting: OBSTETRICS & GYNECOLOGY
Payer: COMMERCIAL

## 2021-01-02 PROBLEM — Z36.89 ENCOUNTER FOR TRIAGE IN PREGNANT PATIENT: Status: ACTIVE | Noted: 2019-03-10

## 2021-01-02 LAB
ABO + RH BLD: NORMAL
ABO + RH BLD: NORMAL
LABORATORY COMMENT REPORT: NORMAL
SARS-COV-2 RNA SPEC QL NAA+PROBE: NEGATIVE
SPECIMEN EXP DATE BLD: NORMAL
SPECIMEN SOURCE: NORMAL
T PALLIDUM AB SER QL: NONREACTIVE

## 2021-01-02 PROCEDURE — 250N000013 HC RX MED GY IP 250 OP 250 PS 637: Performed by: OBSTETRICS & GYNECOLOGY

## 2021-01-02 PROCEDURE — 86900 BLOOD TYPING SEROLOGIC ABO: CPT | Performed by: OBSTETRICS & GYNECOLOGY

## 2021-01-02 PROCEDURE — 120N000001 HC R&B MED SURG/OB

## 2021-01-02 PROCEDURE — 86901 BLOOD TYPING SEROLOGIC RH(D): CPT | Performed by: OBSTETRICS & GYNECOLOGY

## 2021-01-02 PROCEDURE — G0463 HOSPITAL OUTPT CLINIC VISIT: HCPCS

## 2021-01-02 PROCEDURE — 86780 TREPONEMA PALLIDUM: CPT | Performed by: OBSTETRICS & GYNECOLOGY

## 2021-01-02 PROCEDURE — 87635 SARS-COV-2 COVID-19 AMP PRB: CPT | Performed by: OBSTETRICS & GYNECOLOGY

## 2021-01-02 PROCEDURE — 722N000001 HC LABOR CARE VAGINAL DELIVERY SINGLE

## 2021-01-02 PROCEDURE — 250N000009 HC RX 250

## 2021-01-02 RX ORDER — SODIUM CHLORIDE, SODIUM LACTATE, POTASSIUM CHLORIDE, CALCIUM CHLORIDE 600; 310; 30; 20 MG/100ML; MG/100ML; MG/100ML; MG/100ML
INJECTION, SOLUTION INTRAVENOUS CONTINUOUS
Status: DISCONTINUED | OUTPATIENT
Start: 2021-01-02 | End: 2021-01-02

## 2021-01-02 RX ORDER — METHYLERGONOVINE MALEATE 0.2 MG/ML
200 INJECTION INTRAVENOUS
Status: DISCONTINUED | OUTPATIENT
Start: 2021-01-02 | End: 2021-01-02

## 2021-01-02 RX ORDER — OXYTOCIN/0.9 % SODIUM CHLORIDE 30/500 ML
PLASTIC BAG, INJECTION (ML) INTRAVENOUS
Status: COMPLETED
Start: 2021-01-02 | End: 2021-01-02

## 2021-01-02 RX ORDER — HYDROCORTISONE 2.5 %
CREAM (GRAM) TOPICAL 3 TIMES DAILY PRN
Status: DISCONTINUED | OUTPATIENT
Start: 2021-01-02 | End: 2021-01-03 | Stop reason: HOSPADM

## 2021-01-02 RX ORDER — LIDOCAINE HYDROCHLORIDE 10 MG/ML
INJECTION, SOLUTION EPIDURAL; INFILTRATION; INTRACAUDAL; PERINEURAL
Status: DISCONTINUED
Start: 2021-01-02 | End: 2021-01-02 | Stop reason: WASHOUT

## 2021-01-02 RX ORDER — ACETAMINOPHEN 325 MG/1
650 TABLET ORAL EVERY 4 HOURS PRN
Status: DISCONTINUED | OUTPATIENT
Start: 2021-01-02 | End: 2021-01-02

## 2021-01-02 RX ORDER — TRANEXAMIC ACID 10 MG/ML
1 INJECTION, SOLUTION INTRAVENOUS EVERY 30 MIN PRN
Status: DISCONTINUED | OUTPATIENT
Start: 2021-01-02 | End: 2021-01-02

## 2021-01-02 RX ORDER — IBUPROFEN 800 MG/1
800 TABLET, FILM COATED ORAL EVERY 6 HOURS PRN
Status: DISCONTINUED | OUTPATIENT
Start: 2021-01-02 | End: 2021-01-03 | Stop reason: HOSPADM

## 2021-01-02 RX ORDER — MODIFIED LANOLIN
OINTMENT (GRAM) TOPICAL
Status: DISCONTINUED | OUTPATIENT
Start: 2021-01-02 | End: 2021-01-03 | Stop reason: HOSPADM

## 2021-01-02 RX ORDER — NALOXONE HYDROCHLORIDE 0.4 MG/ML
0.4 INJECTION, SOLUTION INTRAMUSCULAR; INTRAVENOUS; SUBCUTANEOUS
Status: DISCONTINUED | OUTPATIENT
Start: 2021-01-02 | End: 2021-01-02

## 2021-01-02 RX ORDER — IBUPROFEN 800 MG/1
800 TABLET, FILM COATED ORAL
Status: COMPLETED | OUTPATIENT
Start: 2021-01-02 | End: 2021-01-02

## 2021-01-02 RX ORDER — OXYTOCIN/0.9 % SODIUM CHLORIDE 30/500 ML
100 PLASTIC BAG, INJECTION (ML) INTRAVENOUS CONTINUOUS
Status: DISCONTINUED | OUTPATIENT
Start: 2021-01-02 | End: 2021-01-03 | Stop reason: HOSPADM

## 2021-01-02 RX ORDER — OXYCODONE AND ACETAMINOPHEN 5; 325 MG/1; MG/1
1 TABLET ORAL
Status: DISCONTINUED | OUTPATIENT
Start: 2021-01-02 | End: 2021-01-02

## 2021-01-02 RX ORDER — OXYTOCIN 10 [USP'U]/ML
10 INJECTION, SOLUTION INTRAMUSCULAR; INTRAVENOUS
Status: DISCONTINUED | OUTPATIENT
Start: 2021-01-02 | End: 2021-01-03 | Stop reason: HOSPADM

## 2021-01-02 RX ORDER — FENTANYL CITRATE 50 UG/ML
INJECTION, SOLUTION INTRAMUSCULAR; INTRAVENOUS
Status: DISCONTINUED
Start: 2021-01-02 | End: 2021-01-02 | Stop reason: WASHOUT

## 2021-01-02 RX ORDER — OXYTOCIN 10 [USP'U]/ML
10 INJECTION, SOLUTION INTRAMUSCULAR; INTRAVENOUS
Status: DISCONTINUED | OUTPATIENT
Start: 2021-01-02 | End: 2021-01-02

## 2021-01-02 RX ORDER — CARBOPROST TROMETHAMINE 250 UG/ML
250 INJECTION, SOLUTION INTRAMUSCULAR
Status: DISCONTINUED | OUTPATIENT
Start: 2021-01-02 | End: 2021-01-02

## 2021-01-02 RX ORDER — AMOXICILLIN 250 MG
1 CAPSULE ORAL 2 TIMES DAILY
Status: DISCONTINUED | OUTPATIENT
Start: 2021-01-02 | End: 2021-01-03 | Stop reason: HOSPADM

## 2021-01-02 RX ORDER — OXYTOCIN/0.9 % SODIUM CHLORIDE 30/500 ML
500 PLASTIC BAG, INJECTION (ML) INTRAVENOUS CONTINUOUS
Status: DISCONTINUED | OUTPATIENT
Start: 2021-01-02 | End: 2021-01-03 | Stop reason: HOSPADM

## 2021-01-02 RX ORDER — OXYTOCIN/0.9 % SODIUM CHLORIDE 30/500 ML
PLASTIC BAG, INJECTION (ML) INTRAVENOUS
Status: DISCONTINUED
Start: 2021-01-02 | End: 2021-01-02 | Stop reason: HOSPADM

## 2021-01-02 RX ORDER — NALOXONE HYDROCHLORIDE 0.4 MG/ML
0.2 INJECTION, SOLUTION INTRAMUSCULAR; INTRAVENOUS; SUBCUTANEOUS
Status: DISCONTINUED | OUTPATIENT
Start: 2021-01-02 | End: 2021-01-02

## 2021-01-02 RX ORDER — OXYTOCIN/0.9 % SODIUM CHLORIDE 30/500 ML
100-340 PLASTIC BAG, INJECTION (ML) INTRAVENOUS CONTINUOUS PRN
Status: COMPLETED | OUTPATIENT
Start: 2021-01-02 | End: 2021-01-02

## 2021-01-02 RX ORDER — ACETAMINOPHEN 325 MG/1
650 TABLET ORAL EVERY 4 HOURS PRN
Status: DISCONTINUED | OUTPATIENT
Start: 2021-01-02 | End: 2021-01-03 | Stop reason: HOSPADM

## 2021-01-02 RX ORDER — TRANEXAMIC ACID 10 MG/ML
1 INJECTION, SOLUTION INTRAVENOUS EVERY 30 MIN PRN
Status: DISCONTINUED | OUTPATIENT
Start: 2021-01-02 | End: 2021-01-03 | Stop reason: HOSPADM

## 2021-01-02 RX ORDER — BISACODYL 10 MG
10 SUPPOSITORY, RECTAL RECTAL DAILY PRN
Status: DISCONTINUED | OUTPATIENT
Start: 2021-01-04 | End: 2021-01-03 | Stop reason: HOSPADM

## 2021-01-02 RX ORDER — AMOXICILLIN 250 MG
2 CAPSULE ORAL 2 TIMES DAILY
Status: DISCONTINUED | OUTPATIENT
Start: 2021-01-02 | End: 2021-01-03 | Stop reason: HOSPADM

## 2021-01-02 RX ORDER — OXYTOCIN/0.9 % SODIUM CHLORIDE 30/500 ML
340 PLASTIC BAG, INJECTION (ML) INTRAVENOUS CONTINUOUS PRN
Status: DISCONTINUED | OUTPATIENT
Start: 2021-01-02 | End: 2021-01-03 | Stop reason: HOSPADM

## 2021-01-02 RX ORDER — ONDANSETRON 2 MG/ML
4 INJECTION INTRAMUSCULAR; INTRAVENOUS EVERY 6 HOURS PRN
Status: DISCONTINUED | OUTPATIENT
Start: 2021-01-02 | End: 2021-01-02

## 2021-01-02 RX ADMIN — Medication 30 UNITS: at 03:59

## 2021-01-02 RX ADMIN — ACETAMINOPHEN 650 MG: 325 TABLET, FILM COATED ORAL at 06:26

## 2021-01-02 RX ADMIN — Medication 340 ML/HR: at 02:38

## 2021-01-02 RX ADMIN — IBUPROFEN 800 MG: 800 TABLET ORAL at 21:40

## 2021-01-02 RX ADMIN — DOCUSATE SODIUM 50 MG AND SENNOSIDES 8.6 MG 1 TABLET: 8.6; 5 TABLET, FILM COATED ORAL at 09:12

## 2021-01-02 RX ADMIN — IBUPROFEN 800 MG: 800 TABLET ORAL at 09:12

## 2021-01-02 RX ADMIN — IBUPROFEN 800 MG: 800 TABLET, FILM COATED ORAL at 03:09

## 2021-01-02 RX ADMIN — DOCUSATE SODIUM 50 MG AND SENNOSIDES 8.6 MG 1 TABLET: 8.6; 5 TABLET, FILM COATED ORAL at 20:01

## 2021-01-02 RX ADMIN — IBUPROFEN 800 MG: 800 TABLET ORAL at 15:48

## 2021-01-02 ASSESSMENT — ACTIVITIES OF DAILY LIVING (ADL)
FALL_HISTORY_WITHIN_LAST_SIX_MONTHS: NO
TOILETING_ISSUES: NO

## 2021-01-02 NOTE — PLAN OF CARE
Patient stable. Bleeding scant. Fundus firm. Saline lock removed per patient request, despite education on risk. She has been independent with self and  cares. No support person present today. Pain well managed with ibuprofen. Tucks pads given for comfort.

## 2021-01-02 NOTE — H&P
"  2021    Jed Del Valle  [id number]            OB Admit History & Physical      Ms. Del Valle  is here in labor.    She has noticed contractions since 9 am    No LMP recorded. Patient is pregnant.   Her Estimated Date of Delivery: Christian 3, 2021  , making her 39w6d  wks.      Estimated body mass index is 22.66 kg/m  as calculated from the following:    Height as of 3/10/19: 1.549 m (5' 1\").    Weight as of 10/7/18: 54.4 kg (119 lb 14.9 oz).  Her prenatal course has been uncomplicated.    See prenatal for labs.  neg GBBS, Rubella Immune, RH pos    Estimated fetal weight= 6.5 pounds       She is a 30 year old   Her OB history:   OB History    Para Term  AB Living   2 1 1 0 0 1   SAB TAB Ectopic Multiple Live Births   0 0 0 0 1      # Outcome Date GA Lbr Moises/2nd Weight Sex Delivery Anes PTL Lv   2 Current            1 Term 19 40w1d 12:05 / 02:59 3.33 kg (7 lb 5.5 oz) M Vag-Spont EPI N ADAM      Name: KIMBERLYNMALE-JED      Apgar1: 7  Apgar5: 9          History reviewed. No pertinent past medical history.     History reviewed. No pertinent surgical history.      No current outpatient medications on file.       Allergies: Patient has no known allergies.      REVIEW OF SYSTEMS:  NEUROLOGIC:  Negative  EYES:  Negative  ENT:  Negative  GI:  Negative  BREAST:  Negative  :  Negative  GYN:  Negative  CV:  Negative  PULMONARY:  Negative  MUSCULOSKELETAL:  Negative  PSYCH:  Negative        Social History     Socioeconomic History     Marital status:      Spouse name: Not on file     Number of children: Not on file     Years of education: Not on file     Highest education level: Not on file   Occupational History     Not on file   Social Needs     Financial resource strain: Not on file     Food insecurity     Worry: Not on file     Inability: Not on file     Transportation needs     Medical: Not on file     Non-medical: Not on file   Tobacco Use     Smoking status: Never Smoker     " Smokeless tobacco: Never Used   Substance and Sexual Activity     Alcohol use: No     Drug use: No     Sexual activity: Yes   Lifestyle     Physical activity     Days per week: Not on file     Minutes per session: Not on file     Stress: Not on file   Relationships     Social connections     Talks on phone: Not on file     Gets together: Not on file     Attends Religion service: Not on file     Active member of club or organization: Not on file     Attends meetings of clubs or organizations: Not on file     Relationship status: Not on file     Intimate partner violence     Fear of current or ex partner: Not on file     Emotionally abused: Not on file     Physically abused: Not on file     Forced sexual activity: Not on file   Other Topics Concern     Not on file   Social History Narrative     Not on file      History reviewed. No pertinent family history.          Vitals:     With contractions every  3 min    Alert Awake in NAD  HEENT grossly normal  Neck: no lymphadenopathy or thryoidomegaly  Lungs CTA  Back normal spinal or CVAT  Heart RRR  ABD gravid, vertex on exam with head palpable  Pelvic:  no fluid noted, no blood noted  Cervix is 10 cm +1 station  EXT:  no edema or calf tenderness  Neuro:  intact    Assessment:  IUP at 39w6d    Labor and complete  GBS neg  Previous baby 7 pounds without delivery issue    Plan:    Anticipate     [unfilled]      Rahul Avila MD  Dept of OB/GYN  2021

## 2021-01-02 NOTE — PLAN OF CARE
Data: [facility] presented to [facility]: 2021  1:52 AM.  Reason for maternal/fetal assessment is uterine contractions. Patient reports having contractions all day, she states they started at 0900 yesterday and became stronger at 2300.  Patient is a .  Prenatal record reviewed. Pregnancy  has been complicated by has been uncomplicated.  Gestational Age Unknown. VSS. Fetal movement present. Patient denies leaking of vaginal fluid/rupture of membranes, vaginal bleeding, abdominal pain, pelvic pressure, nausea, vomiting, headache, visual disturbances, epigastric or URQ pain, significant edema. Support person is present.   Action: Verbal consent for EFM. Triage assessment completed. Bill of rights reviewed.  Response: Patient verbalized agreement with plan. Will contact Dr Rahul Avila with update and further orders.

## 2021-01-02 NOTE — L&D DELIVERY NOTE
Delivery Summary    Caroline Del Valle MRN# [id number]   Age: 30 year old YOB: 1990     Caroline presented to L and D after margoth at home for 16 hours.   She was checked on arrival and found to be complete.  Transferred to a labor room she spontaneously ruptured clear fluid.  She went on to push one time and delivered a viable baby>  Mom had small right labial tear. 3 vc  Placenta intact   EBL 50 ml  No complications       Eligio Female-Caroline [[id number]]    Labor Event Times    Labor onset date: 21 Onset time:  2:00 AM      Labor Length    3rd Stage (hrs): 0 (min): 4      Labor Events     labor?: No   steroids: None  Labor Type: Spontaneous     Antibiotics received during labor?: No     Rupture date/time:     Fluid color: Clear     Delivery/Placenta Date and Time    Delivery Date: 21 Delivery Time:  2:37 AM   Placenta Date/Time: 2021  2:41 AM     Vaginal Counts          Needles Suture Flint Sponges Instruments   Initial counts 2  5    Added to count       Final counts       Placed during labor Accounted for at the end of labor           Apgars    Living status: Living   1 Minute 5 Minute 10 Minute 15 Minute 20 Minute   Skin color:        Heart rate:        Reflex irritability:        Muscle tone:        Respiratory effort:        Total:           Cord    Vessels: 3 Vessels    Cord Blood Disposition: Lab Gases Sent?: No      [place] Resuscitation    Methods: None     Labor Events and Shoulder Dystocia    Fetal Tracing Prior to Delivery: Category 1     Delivery (Maternal) (Provider to Complete) (129324)    Episiotomy: None  Perineal lacerations: None    Est. blood loss (mL): 50  Number of repair packets: 0     Blood Loss  Mother: Caroline Del Valle #[id number]   Start of Mother's Information    IO Blood Loss  21 0200 - 21 0256    EBL (mL) Hospital Encounter 50 mL    Total  50 mL         End of Mother's Information  Mother: Caroline Del Valle  #[id number]          Delivery - Provider to Complete (402964)    Attempted Delivery Types (Choose all that apply): Spontaneous Vaginal Delivery  Delivery Type (Choose the 1 that will go to the Birth History): Vaginal, Spontaneous                                 Placenta    Delayed Cord Clamping: Done  Date/Time: 1/2/2021  2:41 AM  Removal: Spontaneous  Disposition: Hospital disposal           Anesthesia    Method: None                Presentation and Position    Presentation: Vertex    Position: Left Occiput Anterior                 Rahul Avila

## 2021-01-02 NOTE — PROVIDER NOTIFICATION
01/02/21 0305   Provider Notification   Provider Name/Title Dr. Avila   Method of Notification At Bedside   Request Evaluate in Person   Notification Reason Other  (Bleeding)     MD at bedside. Two fundal exams with moderate bleeding, no clots present. Fundus firm, U/1. MD states to infuse first bag of IV Pitocin at 600 mL/hr and infuse a second bag at standard rate at 100 mL/hr.

## 2021-01-02 NOTE — PLAN OF CARE
Data: Caroline Del Valle transferred to room 450 via wheelchair at 0520. Baby transferred via parent's arms.  Action: Receiving unit notified of transfer: Yes. Patient and family notified of room change. Report given to BISI Petty at 0525. Belongings sent to receiving unit. Accompanied by Registered Nurse. Oriented patient to surroundings. Call light within reach. ID bands double-checked with receiving RN.  Response: Patient tolerated transfer and is stable.

## 2021-01-02 NOTE — PLAN OF CARE
Data: Vital signs within normal limits. Postpartum checks within normal limits, see flow record. Patient eating and drinking normally. Patient able to empty bladder independently and is up ambulating. No apparent signs of infection. Patient performing self cares and is able to care for infant.  Action: Patient medicated during the shift for pain and cramping. See MAR. Patient reassessed within 1 hour after each medication and patient stated she was comfortable. Patient education done about safe infant sleep,bulb syringe usage, basic infant cares, breastfeeding cues and satiety, post partum recovery, pain management. [facility] booklet reviewed and questions answered . See flow record.  Response: Positive attachment behaviors observed with infant. Support person,  Dolores went home at 0630 to be with their son and will be back later this morning.    Plan: Continue to monitor, assess, and prepare for discharge. Anticipate discharge on 01/03/21.

## 2021-01-02 NOTE — PLAN OF CARE
Up ad kj. Voiding without difficulty . Scant bleeding .   Instructed to fill out birth certificate and depression scale  and watch videos .   Taking Ibuprofen for uterine cramping. FOB here, supportive.

## 2021-01-02 NOTE — PROVIDER NOTIFICATION
01/02/21 0212   Provider Notification   Provider Name/Title Dr. Avila   Method of Notification At Bedside   Request Evaluate in Person   Notification Reason SVE;Status Update     MD at bedside. Pt very uncomfortable with contractions, states she is feeling pressure and has the urge to push. SVE 10/100/1. Membranes intact. Will transfer Pt to a labor room, intrapartum orders received. Pt requesting an epidural.

## 2021-01-02 NOTE — PROVIDER NOTIFICATION
01/02/21 0230   Provider Notification   Provider Name/Title Dr. Avila   Method of Notification At Bedside   Request Attend Delivery     Pt spontaneously ruptured. She states she has the urge to push. MD called to bedside. Infant delivered at 0235.

## 2021-01-03 VITALS
RESPIRATION RATE: 16 BRPM | TEMPERATURE: 97.8 F | DIASTOLIC BLOOD PRESSURE: 71 MMHG | HEART RATE: 78 BPM | BODY MASS INDEX: 22.66 KG/M2 | SYSTOLIC BLOOD PRESSURE: 117 MMHG | HEIGHT: 61 IN

## 2021-01-03 PROCEDURE — 250N000013 HC RX MED GY IP 250 OP 250 PS 637: Performed by: OBSTETRICS & GYNECOLOGY

## 2021-01-03 RX ORDER — IBUPROFEN 600 MG/1
600 TABLET, FILM COATED ORAL EVERY 6 HOURS PRN
Qty: 30 TABLET | Refills: 0 | Status: SHIPPED | OUTPATIENT
Start: 2021-01-03

## 2021-01-03 RX ADMIN — IBUPROFEN 800 MG: 800 TABLET ORAL at 13:09

## 2021-01-03 RX ADMIN — DOCUSATE SODIUM 50 MG AND SENNOSIDES 8.6 MG 2 TABLET: 8.6; 5 TABLET, FILM COATED ORAL at 08:52

## 2021-01-03 RX ADMIN — ACETAMINOPHEN 650 MG: 325 TABLET, FILM COATED ORAL at 08:52

## 2021-01-03 RX ADMIN — IBUPROFEN 800 MG: 800 TABLET ORAL at 06:40

## 2021-01-03 NOTE — PLAN OF CARE
VSS. Pain managed with ibuprofen and tucks pads.  Patient ambulating and voiding without difficulty.  Patient breastfeeding often as infant is cluster feeding; good latch and suck/swallow observed.  Encouraged patient to complete birth certificate and PPD screening; plans to complete in AM when  returns.  Bonding well with infant and independent with cares.  Desires discharge home today, if possible.

## 2021-01-03 NOTE — DISCHARGE SUMMARY
[facility] Discharge Summary    Caroline Del Valle MRN# [id number]   Age: 30 year old YOB: 1990     Date of Admission:  1/2/2021  Date of Discharge::  1/3/2021  Admitting Physician:  Rahul Avila  Discharge Physician:  Rahul Avila     Home clinic: [facility]          Admission Diagnoses:   PREGNANCY  Encounter for triage in pregnant patient  Indication for care in labor or delivery  Normal labor and delivery          Discharge Diagnosis:   Normal spontaneous vaginal delivery  Intrauterine pregnancy at 39 weeks gestation          Procedures:   Procedure(s): No additional procedures performed                  Medications Prior to Admission:     Medications Prior to Admission   Medication Sig Dispense Refill Last Dose     Prenatal Vit-Fe Fumarate-FA (PRENATAL MULTIVITAMIN W/IRON) 27-0.8 MG tablet Take 1 tablet by mouth daily   1/1/2021 at Unknown time     acetaminophen (TYLENOL) 325 MG tablet Take 1-2 tablets (325-650 mg) by mouth every 6 hours as needed for mild pain 100 tablet 0      fish oil-omega-3 fatty acids 1000 MG capsule Take 2 g by mouth daily        ibuprofen (ADVIL/MOTRIN) 600 MG tablet Take 1 tablet (600 mg) by mouth every 6 hours as needed for moderate pain 90 tablet 1      senna-docusate (SENOKOT-S/PERICOLACE) 8.6-50 MG tablet Take 1 tablet by mouth 2 times daily as needed for constipation 60 tablet 1              Discharge Medications:   Ibuprofen          Consultations:   No consultations were requested during this admission          Brief History of Labor:   Caroline presented to L and D after margoth at home for 16 hours.   She was checked on arrival and found to be complete.  Transferred to a labor room she spontaneously ruptured clear fluid.  She went on to push one time and delivered a viable baby>  Mom had small right labial tear. 3 vc  Placenta intact   EBL 50 ml  No complications           Hospital Course:   The patient's hospital course was unremarkable.   On discharge, her pain was well controlled. Vaginal bleeding is similar to peak menstrual flow.  Voiding without difficulty.  Ambulating well and tolerating a normal diet.  No fever.  Breastfeeding well.  Infant is stable.  No bowel movement yet.*  She was discharged on post-partum day #1.    Post-partum hemoglobin:   Hemoglobin   Date Value Ref Range Status   10/07/2018 12.7 11.7 - 15.7 g/dL Final             Discharge Instructions and Follow-Up:   Discharge diet: Regular   Discharge activity: Activity as tolerated   Discharge follow-up: Follow up with primary care provider in 6 weeks   Wound care:            Discharge Disposition:   Discharged to home      Attestation:  I have reviewed today's vital signs, notes, medications, labs and imaging.    Rahul Avila

## 2021-01-03 NOTE — DISCHARGE INSTRUCTIONS
[facility]: [phone]    Postpartum Vaginal Delivery Instructions    Activity       Ask family and friends for help when you need it.    Do not place anything in your vagina for 6 weeks.    You are not restricted on other activities, but take it easy for a few weeks to allow your body to recover from delivery.  You are able to do any activities you feel up to that point.    No driving until you have stopped taking your pain medications (usually two weeks after delivery).     Call your health care provider if you have any of these symptoms:       Increased pain, swelling, redness, or fluid around your stiches from an episiotomy or perineal tear.    A fever above 100.4 F (38 C) with or without chills when placing a thermometer under your tongue.    You soak a sanitary pad with blood within 1 hour, or you see blood clots larger than a golf ball.    Bleeding that lasts more than 6 weeks.    Vaginal discharge that smells bad.    Severe pain, cramping or tenderness in your lower belly area.    A need to urinate more frequently (use the toilet more often), more urgently (use the toilet very quickly), or it burns when you urinate.    Nausea and vomiting.    Redness, swelling or pain around a vein in your leg.    Problems breastfeeding or a red or painful area on your breast.    Chest pain and cough or are gasping for air.    Problems coping with sadness, anxiety, or depression.  If you have any concerns about hurting yourself or the baby, call your provider immediately.     You have questions or concerns after you return home.     Keep your hands clean:  Always wash your hands before touching your perineal area and stitches.  This helps reduce your risk of infection.  If your hands aren't dirty, you may use an alcohol hand-rub to clean your hands. Keep your nails clean and short.

## 2021-01-03 NOTE — PROGRESS NOTES
[facility] Obstetrics Post-Partum Progress Note          Assessment and Plan:    Assessment:   Post-partum day #1  Normal spontaneous vaginal delivery  L&D complications: None      Doing well.  Normal healing wound.  No excessive bleeding  Pain well-controlled.      Plan:   Discharge later today           Interval History:   Doing well.  Pain is adequately controlled.  No fevers.  No history of foul-smelling vaginal discharge.  Good appetite.  Denies chest pain, shortness of breath, nausea or vomiting.  Vaginal bleeding is similar to a heavy menstrual flow.  Breastfeeding well.          Significant Problems:    None          Review of Systems:    The patient denies any chest pain, shortness of breath, excessive pain, fever, chills, purulent drainage from the wound, nausea or vomiting.          Medications:   All medications have been reviewed          Physical Exam:   All vitals stable  Uterine fundus is firm, non-tender and at the level of the umbilicus          Data:   All laboratory data reviewed      Rahul Avila

## 2021-01-03 NOTE — PLAN OF CARE
Patient stable and ready for discharge. AVS reviewed and all questions answered. Ibuprofen given for home. Breast pump given, as well as [facility] instructions for cleaning pump parts. Patient feels like her breasts are filing and getting firm. Reviewed feeding and encouraged her to continue feeding frequently to prevent engorgement. EPDS completed, scored 3. Patient discharged with FOB and  at 1410 with all belongings.

## 2023-02-16 ENCOUNTER — OFFICE VISIT (OUTPATIENT)
Dept: URGENT CARE | Facility: URGENT CARE | Age: 33
End: 2023-02-16
Payer: COMMERCIAL

## 2023-02-16 ENCOUNTER — NURSE TRIAGE (OUTPATIENT)
Dept: NURSING | Facility: CLINIC | Age: 33
End: 2023-02-16
Payer: COMMERCIAL

## 2023-02-16 VITALS
OXYGEN SATURATION: 100 % | BODY MASS INDEX: 22.67 KG/M2 | WEIGHT: 120 LBS | HEART RATE: 78 BPM | TEMPERATURE: 98.2 F | SYSTOLIC BLOOD PRESSURE: 118 MMHG | RESPIRATION RATE: 16 BRPM | DIASTOLIC BLOOD PRESSURE: 80 MMHG

## 2023-02-16 DIAGNOSIS — N89.8 VAGINAL DISCHARGE: Primary | ICD-10-CM

## 2023-02-16 LAB
ALBUMIN UR-MCNC: NEGATIVE MG/DL
APPEARANCE UR: CLEAR
BILIRUB UR QL STRIP: NEGATIVE
CLUE CELLS: ABNORMAL
COLOR UR AUTO: YELLOW
GLUCOSE UR STRIP-MCNC: NEGATIVE MG/DL
HGB UR QL STRIP: NEGATIVE
KETONES UR STRIP-MCNC: NEGATIVE MG/DL
LEUKOCYTE ESTERASE UR QL STRIP: NEGATIVE
NITRATE UR QL: NEGATIVE
PH UR STRIP: 6.5 [PH] (ref 5–7)
SP GR UR STRIP: <=1.005 (ref 1–1.03)
TRICHOMONAS, WET PREP: ABNORMAL
UROBILINOGEN UR STRIP-ACNC: 0.2 E.U./DL
WBC'S/HIGH POWER FIELD, WET PREP: ABNORMAL
YEAST, WET PREP: ABNORMAL

## 2023-02-16 PROCEDURE — 87210 SMEAR WET MOUNT SALINE/INK: CPT | Performed by: FAMILY MEDICINE

## 2023-02-16 PROCEDURE — 81003 URINALYSIS AUTO W/O SCOPE: CPT | Performed by: FAMILY MEDICINE

## 2023-02-16 PROCEDURE — 99203 OFFICE O/P NEW LOW 30 MIN: CPT | Performed by: FAMILY MEDICINE

## 2023-02-16 RX ORDER — FLUCONAZOLE 150 MG/1
150 TABLET ORAL ONCE
Qty: 1 TABLET | Refills: 0 | Status: SHIPPED | OUTPATIENT
Start: 2023-02-16 | End: 2023-02-16

## 2023-02-16 ASSESSMENT — PAIN SCALES - GENERAL: PAINLEVEL: MILD PAIN (2)

## 2023-02-16 NOTE — TELEPHONE ENCOUNTER
"Pt reports abnormal vaginal discharge \"white and cheesy\" and itchy rash for four days. Pt states \"it's not a yeast infection. Pt denies abdominal pain or fever.     Advised pt to be seen in clinic within 24 hours per protocol.     Pt verbalizes understanding and plans to go to  now.     Reason for Disposition    [1] Rash (e.g., redness, tiny bumps, sore) of genital area AND [2] present > 24 hours    Additional Information    Negative: Followed a genital area injury (e.g., vagina, vulva)    Negative: Symptoms could be from sexual assault(AFTER using this guideline to treat symptoms, go to guideline SEXUAL ASSAULT OR RAPE)    Negative: Pain or burning with passing urine (urination) is main symptom    Negative: Foreign body in vagina (e.g., tampon)    Negative: [1] Pregnant 20 or more weeks  (5 months or more) AND [2] contractions    Negative: Pregnant    Negative: [1] SEVERE abdominal pain (e.g., excruciating) AND [2] present > 1 hour    Negative: Patient sounds very sick or weak to the triager    Negative: [1] Yellow or green vaginal discharge AND [2] fever    Negative: [1] Genital area looks infected (e.g., draining sore, spreading redness) AND [2] fever    Negative: [1] Constant abdominal pain AND [2] present > 2 hours    Negative: [1] Mild lower abdominal pain comes and goes (cramps) AND [2] lasts > 24 hours    Negative: Genital area looks infected (e.g., draining sore, spreading redness)    Negative: [1] Rash is tiny water blisters AND [2] 3 or more    Protocols used: VAGINAL [id number]      "

## 2023-02-17 NOTE — PROGRESS NOTES
SUBJECTIVE:  Chief Complaint   Patient presents with     Urgent Care     Vaginal Discharge     Patient is here for vaginal pain and vaginal discharge. (x4 days)     Vaginal Pain     Caroline Del Valle is a 32 year old female who presents with a chief complaint of vaginal discharge and vaginal pain.    Symptoms present for 4 days.    monogamous relationship with , no concerns for STD    After intercourse, the following day was wearing tight leggings and felt more uncomfortable.  Mild itchiness and then developed more discharge.  The discharge has improved.  No changes in products use.    Denies any dysuria symptoms.     No past medical history on file.  Current Outpatient Medications   Medication Sig Dispense Refill     fish oil-omega-3 fatty acids 1000 MG capsule Take 2 g by mouth daily       ibuprofen (ADVIL/MOTRIN) 600 MG tablet Take 1 tablet (600 mg) by mouth every 6 hours as needed for moderate pain 30 tablet 0     Prenatal Vit-Fe Fumarate-FA (PRENATAL MULTIVITAMIN W/IRON) 27-0.8 MG tablet Take 1 tablet by mouth daily       Social History     Tobacco Use     Smoking status: Never     Smokeless tobacco: Never   Substance Use Topics     Alcohol use: No       ROS:  Review of systems negative except as stated above.    EXAM:   /80 (BP Location: Right arm, Patient Position: Sitting, Cuff Size: Adult Regular)   Pulse 78   Temp 98.2  F (36.8  C) (Oral)   Resp 16   Wt 54.4 kg (120 lb)   SpO2 100%   BMI 22.67 kg/m    GENERAL APPEARANCE: healthy, alert and no distress  GENITAL: normal external genitalia, no vesicles, ulceration, no erythema or overt discharge  PSYCH:alert, affect bright    Results for orders placed or performed in visit on 02/16/23   UA reflex to Microscopic and Culture     Status: Normal    Specimen: Urine, Clean Catch   Result Value Ref Range    Color Urine Yellow Colorless, Straw, Light Yellow, Yellow    Appearance Urine Clear Clear    Glucose Urine Negative Negative mg/dL     Bilirubin Urine Negative Negative    Ketones Urine Negative Negative mg/dL    Specific Gravity Urine <=1.005 1.003 - 1.035    Blood Urine Negative Negative    pH Urine 6.5 5.0 - 7.0    Protein Albumin Urine Negative Negative mg/dL    Urobilinogen Urine 0.2 0.2, 1.0 E.U./dL    Nitrite Urine Negative Negative    Leukocyte Esterase Urine Negative Negative    Narrative    Microscopic not indicated   Wet preparation     Status: Abnormal    Specimen: Vagina; Swab   Result Value Ref Range    Trichomonas Absent Absent    Yeast Absent Absent    Clue Cells Absent Absent    WBCs/high power field 1+ (A) None       ASSESSMENT/PLAN:  (N89.8) Vaginal discharge  (primary encounter diagnosis)  Plan: Wet preparation, UA reflex to Microscopic and         Culture, fluconazole (DIFLUCAN) 150 MG tablet            Reassurance given, reviewed that wet prep and UA reassuring, empiric treatment for presumptive yeast infection that may be causing irritation - RX diflucan given.  Okay to apply topical aquaphor or coconut oil to area to help sooth irritation.    Follow up with primary provider if no improvement of symptoms within 1 week    Elfego Hahn MD  February 16, 2023 6:40 PM

## (undated) RX ORDER — MORPHINE SULFATE 1 MG/ML
INJECTION, SOLUTION EPIDURAL; INTRATHECAL; INTRAVENOUS
Status: DISPENSED
Start: 2021-01-02